# Patient Record
Sex: FEMALE | Race: BLACK OR AFRICAN AMERICAN | NOT HISPANIC OR LATINO | Employment: FULL TIME | URBAN - METROPOLITAN AREA
[De-identification: names, ages, dates, MRNs, and addresses within clinical notes are randomized per-mention and may not be internally consistent; named-entity substitution may affect disease eponyms.]

---

## 2021-01-12 ENCOUNTER — HISTORICAL (OUTPATIENT)
Dept: RADIOLOGY | Facility: HOSPITAL | Age: 46
End: 2021-01-12

## 2021-01-19 ENCOUNTER — HISTORICAL (OUTPATIENT)
Dept: RADIOLOGY | Facility: HOSPITAL | Age: 46
End: 2021-01-19

## 2021-06-01 LAB
ABS NEUT (OLG): 3.6 X10(3)/MCL (ref 1.5–6.9)
APPEARANCE, UA: ABNORMAL
B-HCG SERPL QL: NEGATIVE
BACTERIA SPEC CULT: ABNORMAL /HPF
BILIRUB UR QL STRIP: NEGATIVE
BUN SERPL-MCNC: 8 MG/DL (ref 7–18.7)
CALCIUM SERPL-MCNC: 9.3 MG/DL (ref 8.4–10.2)
CHLORIDE SERPL-SCNC: 105 MMOL/L (ref 98–107)
CO2 SERPL-SCNC: 28 MMOL/L (ref 22–29)
COLOR UR: YELLOW
CREAT SERPL-MCNC: 0.89 MG/DL (ref 0.55–1.02)
CREAT/UREA NIT SERPL: 9
ERYTHROCYTE [DISTWIDTH] IN BLOOD BY AUTOMATED COUNT: 14.7 % (ref 11.5–17)
EST CREAT CLEARANCE SER (OHS): 193 ML/MIN
GLUCOSE (UA): NEGATIVE MG/DL
GLUCOSE SERPL-MCNC: 117 MG/DL (ref 74–100)
HCT VFR BLD AUTO: 41.5 % (ref 36–48)
HGB BLD-MCNC: 12.9 GM/DL (ref 12–16)
HGB UR QL STRIP: ABNORMAL
KETONES UR QL STRIP: NEGATIVE MG/DL
LEUKOCYTE ESTERASE UR QL STRIP: ABNORMAL
MCH RBC QN AUTO: 27 PG (ref 27–34)
MCHC RBC AUTO-ENTMCNC: 31 GM/DL (ref 31–36)
MCV RBC AUTO: 86 FL (ref 80–99)
NITRITE UR QL STRIP: POSITIVE
PH UR STRIP: 5.5 [PH] (ref 4.6–8)
PLATELET # BLD AUTO: 321 X10(3)/MCL (ref 140–400)
PMV BLD AUTO: 11.3 FL (ref 6.8–10)
POTASSIUM SERPL-SCNC: 3.7 MMOL/L (ref 3.5–5.1)
PROT UR QL STRIP: ABNORMAL
RBC # BLD AUTO: 4.84 X10(6)/MCL (ref 4.2–5.4)
RBC #/AREA URNS HPF: ABNORMAL /HPF
SARS-COV-2 AG RESP QL IA.RAPID: NOT DETECTED
SODIUM SERPL-SCNC: 140 MMOL/L (ref 136–145)
SP GR UR STRIP: 1.01 (ref 1–1.03)
SQUAMOUS EPITHELIAL, UA: ABNORMAL /LPF
UROBILINOGEN UR STRIP-ACNC: 0.2 EU/DL
WBC # SPEC AUTO: 7 X10(3)/MCL (ref 4.5–11.5)
WBC #/AREA URNS HPF: ABNORMAL /HPF

## 2021-06-03 ENCOUNTER — HISTORICAL (OUTPATIENT)
Dept: ANESTHESIOLOGY | Facility: HOSPITAL | Age: 46
End: 2021-06-03

## 2021-09-20 ENCOUNTER — HISTORICAL (OUTPATIENT)
Dept: RADIOLOGY | Facility: HOSPITAL | Age: 46
End: 2021-09-20

## 2021-09-23 LAB — GLUCOSE SERPL-MCNC: 118 MG/DL (ref 70–110)

## 2022-04-30 NOTE — OP NOTE
PREOPERATIVE DIAGNOSIS:  Medial meniscal tear.    POSTOPERATIVE DIAGNOSIS:  Posterior horn medial meniscal tear and chondral lesion medial femoral condyle.    PROCEDURE:  Arthroscopy, partial medial meniscectomy, chondroplasty medially.    ANESTHESIA:  Spinal.    COMPLICATIONS:  None.    DESCRIPTION OF PROCEDURE:  The patient is 45 with pain and swelling.  MRI confirmed medial meniscal tear.  She was brought the OR, given IV antibiotics, general anesthesia.  Inflow was established.  The knee was examined arthroscopically.  She was morbidly obese which made arthroscopy a little bit challenging, but we were able to establish inflow.  In the notch of the knee, the anterior posterior cruciate ligaments were noted to be intact.  Lateral joint line was pristine.  Patellofemoral had some mild chondral scuffing.  Medially she had significant medial femoral condylar lesion with unstable grade 3 changes.  She had a posterior horn medial meniscal tear.  Using biters and a shaver, I did a partial medial meniscectomy to a stable rim.  I did a chondroplasty medially.  The scope was withdrawn.  Portals were closed with nylon.  The knee was injected.  Sterile dressing applied.  No complications.        MRH/MODL   DD: 06/03/2021 0854   DT: 06/03/2021 0908  Job # [id number]

## 2023-07-17 ENCOUNTER — HOSPITAL ENCOUNTER (EMERGENCY)
Facility: HOSPITAL | Age: 48
Discharge: HOME | End: 2023-07-17
Attending: INTERNAL MEDICINE
Payer: COMMERCIAL

## 2023-07-17 VITALS
TEMPERATURE: 98 F | RESPIRATION RATE: 18 BRPM | DIASTOLIC BLOOD PRESSURE: 99 MMHG | BODY MASS INDEX: 48.82 KG/M2 | HEIGHT: 65 IN | WEIGHT: 293 LBS | HEART RATE: 95 BPM | SYSTOLIC BLOOD PRESSURE: 175 MMHG | OXYGEN SATURATION: 98 %

## 2023-07-17 DIAGNOSIS — K04.7 DENTAL INFECTION: Primary | ICD-10-CM

## 2023-07-17 DIAGNOSIS — K02.9 DENTAL CARIES: ICD-10-CM

## 2023-07-17 DIAGNOSIS — K08.89 PAIN, DENTAL: ICD-10-CM

## 2023-07-17 PROCEDURE — 63600175 PHARM REV CODE 636 W HCPCS: Performed by: INTERNAL MEDICINE

## 2023-07-17 PROCEDURE — 25000003 PHARM REV CODE 250: Performed by: INTERNAL MEDICINE

## 2023-07-17 PROCEDURE — 96372 THER/PROPH/DIAG INJ SC/IM: CPT | Performed by: INTERNAL MEDICINE

## 2023-07-17 PROCEDURE — 99284 EMERGENCY DEPT VISIT MOD MDM: CPT

## 2023-07-17 RX ORDER — AMOXICILLIN 500 MG/1
500 TABLET, FILM COATED ORAL EVERY 12 HOURS
Qty: 20 TABLET | Refills: 0 | Status: SHIPPED | OUTPATIENT
Start: 2023-07-17 | End: 2023-07-27

## 2023-07-17 RX ORDER — KETOROLAC TROMETHAMINE 30 MG/ML
60 INJECTION, SOLUTION INTRAMUSCULAR; INTRAVENOUS
Status: COMPLETED | OUTPATIENT
Start: 2023-07-17 | End: 2023-07-17

## 2023-07-17 RX ORDER — AMOXICILLIN 250 MG/1
500 CAPSULE ORAL
Status: COMPLETED | OUTPATIENT
Start: 2023-07-17 | End: 2023-07-17

## 2023-07-17 RX ORDER — LIDOCAINE HYDROCHLORIDE 20 MG/ML
SOLUTION OROPHARYNGEAL
Qty: 100 ML | Refills: 0 | Status: SHIPPED | OUTPATIENT
Start: 2023-07-17 | End: 2023-07-22

## 2023-07-17 RX ADMIN — KETOROLAC TROMETHAMINE 60 MG: 30 INJECTION, SOLUTION INTRAMUSCULAR at 05:07

## 2023-07-17 RX ADMIN — AMOXICILLIN 500 MG: 250 CAPSULE ORAL at 05:07

## 2023-07-17 NOTE — ED PROVIDER NOTES
Encounter Date: 7/17/2023       History     Chief Complaint   Patient presents with    Dental Problem     Pt states that she has a dental abscess that she has been having for a while and has not gotten it pulled yet because she has been trying to get her BP under control. States this morning the pain is much worse     47-year-old black female presents emergency department complaining of pain in the right lower jaw in an area where she is supposed to have a tooth pulled.  She reports she went to the dentist in her pressure was too high so she would to go back to her primary care and get a blood pressure under control before she can get a tooth pulled    Review of patient's allergies indicates:  No Known Allergies  Past Medical History:   Diagnosis Date    Hypertension      History reviewed. No pertinent surgical history.  History reviewed. No pertinent family history.     Review of Systems   Constitutional: Negative.  Negative for activity change, appetite change, chills, diaphoresis, fatigue, fever and unexpected weight change.   HENT:  Positive for dental problem. Negative for congestion, drooling, ear discharge, ear pain, facial swelling, hearing loss, mouth sores, nosebleeds, postnasal drip, rhinorrhea, sinus pressure, sinus pain, sneezing, sore throat, tinnitus, trouble swallowing and voice change.    Eyes: Negative.  Negative for photophobia, pain, discharge, redness, itching and visual disturbance.   Respiratory: Negative.  Negative for apnea, cough, choking, chest tightness, shortness of breath, wheezing and stridor.    Cardiovascular: Negative.  Negative for chest pain, palpitations and leg swelling.   Gastrointestinal: Negative.  Negative for abdominal distention, abdominal pain, anal bleeding, blood in stool, constipation, diarrhea, nausea, rectal pain and vomiting.   Endocrine: Negative.  Negative for cold intolerance, heat intolerance, polydipsia, polyphagia and polyuria.   Genitourinary: Negative.   Negative for decreased urine volume, difficulty urinating, dyspareunia, dysuria, enuresis, flank pain, frequency, genital sores, hematuria, menstrual problem, pelvic pain, urgency, vaginal bleeding, vaginal discharge and vaginal pain.   Musculoskeletal: Negative.  Negative for arthralgias, back pain, gait problem, joint swelling, myalgias, neck pain and neck stiffness.   Skin: Negative.  Negative for color change, pallor, rash and wound.   Allergic/Immunologic: Negative.  Negative for environmental allergies, food allergies and immunocompromised state.   Neurological: Negative.  Negative for dizziness, tremors, seizures, syncope, facial asymmetry, speech difficulty, weakness, light-headedness, numbness and headaches.   Hematological: Negative.  Negative for adenopathy. Does not bruise/bleed easily.   Psychiatric/Behavioral: Negative.  Negative for agitation, behavioral problems, confusion, decreased concentration, dysphoric mood, hallucinations, self-injury, sleep disturbance and suicidal ideas. The patient is not nervous/anxious and is not hyperactive.    All other systems reviewed and are negative.    Physical Exam     Initial Vitals [07/17/23 0401]   BP Pulse Resp Temp SpO2   (!) 175/99 95 18 97.6 °F (36.4 °C) 98 %      MAP       --         Physical Exam    Nursing note and vitals reviewed.  Constitutional: She appears well-developed and well-nourished.   HENT:   Head: Normocephalic and atraumatic.   Nose: Nose normal.   Mouth/Throat: Oropharynx is clear and moist.       Eyes: EOM are normal.   Neck: Neck supple.   Normal range of motion.  Musculoskeletal:         General: Normal range of motion.      Cervical back: Normal range of motion and neck supple.     Neurological: She is alert.   Skin: Skin is warm.   Psychiatric: She has a normal mood and affect.       ED Course   Procedures  Labs Reviewed - No data to display       Imaging Results    None          Medications   ketorolac injection 60 mg (has no  administration in time range)   amoxicillin capsule 500 mg (has no administration in time range)     Medical Decision Making:   Differential Diagnosis:   Dental caries, dental abscess, gingivitis,                        Clinical Impression:   Final diagnoses:  [K04.7] Dental infection (Primary)  [K02.9] Dental caries  [K08.89] Pain, dental        ED Disposition Condition    Discharge Stable          ED Prescriptions       Medication Sig Dispense Start Date End Date Auth. Provider    amoxicillin (AMOXIL) 500 MG Tab Take 1 tablet (500 mg total) by mouth every 12 (twelve) hours. for 10 days 20 tablet 7/17/2023 7/27/2023 Kiko Cannon MD    LIDOcaine HCl 2% (LIDOCAINE VISCOUS) 2 % Soln by Mucous Membrane route every 3 (three) hours. for 5 days 100 mL 7/17/2023 7/22/2023 Kiko Cannon MD          Follow-up Information       Follow up With Specialties Details Why Contact Info    Maik Glil MD Family Medicine In 2 days  [address]  [address]  [place] [address] [phone]            Amee Augustin is a certified MA and was present during the entire interaction with this patient      Kiko Cannon MD  07/17/23 8974

## 2023-07-17 NOTE — Clinical Note
"Aide "Milton Arreola was seen and treated in our emergency department on 7/17/2023.  She may return to work on 07/18/2023.       If you have any questions or concerns, please don't hesitate to call.      Liz Cabrera LPN    "

## 2023-10-19 ENCOUNTER — HOSPITAL ENCOUNTER (INPATIENT)
Facility: HOSPITAL | Age: 48
LOS: 4 days | Discharge: HOME | DRG: 690 | End: 2023-10-23
Attending: FAMILY MEDICINE | Admitting: FAMILY MEDICINE
Payer: COMMERCIAL

## 2023-10-19 DIAGNOSIS — E87.6 HYPOKALEMIA: Primary | ICD-10-CM

## 2023-10-19 DIAGNOSIS — R07.9 CHEST PAIN: ICD-10-CM

## 2023-10-19 DIAGNOSIS — N12 PYELONEPHRITIS: ICD-10-CM

## 2023-10-19 PROBLEM — R53.1 WEAKNESS: Status: ACTIVE | Noted: 2023-10-19

## 2023-10-19 PROBLEM — F32.A DEPRESSIVE DISORDER: Status: ACTIVE | Noted: 2023-10-19

## 2023-10-19 PROBLEM — R06.02 SHORTNESS OF BREATH: Status: ACTIVE | Noted: 2023-10-19

## 2023-10-19 PROBLEM — E66.01 MORBID OBESITY: Status: ACTIVE | Noted: 2023-10-19

## 2023-10-19 PROBLEM — I10 HYPERTENSION: Status: ACTIVE | Noted: 2023-10-19

## 2023-10-19 PROBLEM — R11.0 NAUSEA: Status: ACTIVE | Noted: 2023-10-19

## 2023-10-19 LAB
ALBUMIN SERPL-MCNC: 2.9 G/DL (ref 3.5–5)
ALBUMIN/GLOB SERPL: 0.6 RATIO (ref 1.1–2)
ALP SERPL-CCNC: 87 UNIT/L (ref 40–150)
ALT SERPL-CCNC: 14 UNIT/L (ref 0–55)
AST SERPL-CCNC: 26 UNIT/L (ref 5–34)
BASOPHILS # BLD AUTO: 0.01 X10(3)/MCL
BASOPHILS NFR BLD AUTO: 0.1 %
BILIRUB SERPL-MCNC: 0.9 MG/DL
BNP BLD-MCNC: 310.4 PG/ML
BUN SERPL-MCNC: 5 MG/DL (ref 7–18.7)
CALCIUM SERPL-MCNC: 8.8 MG/DL (ref 8.4–10.2)
CHLORIDE SERPL-SCNC: 101 MMOL/L (ref 98–107)
CK MB SERPL-MCNC: <1 NG/ML
CO2 SERPL-SCNC: 23 MMOL/L (ref 22–29)
CREAT SERPL-MCNC: 0.83 MG/DL (ref 0.55–1.02)
EOSINOPHIL # BLD AUTO: 0 X10(3)/MCL (ref 0–0.9)
EOSINOPHIL NFR BLD AUTO: 0 %
ERYTHROCYTE [DISTWIDTH] IN BLOOD BY AUTOMATED COUNT: 14.2 % (ref 11.5–17)
GFR SERPLBLD CREATININE-BSD FMLA CKD-EPI: >60 MLS/MIN/1.73/M2
GLOBULIN SER-MCNC: 4.5 GM/DL (ref 2.4–3.5)
GLUCOSE SERPL-MCNC: 140 MG/DL (ref 74–100)
HCT VFR BLD AUTO: 36.5 % (ref 37–47)
HGB BLD-MCNC: 12.1 G/DL (ref 12–16)
IMM GRANULOCYTES # BLD AUTO: 0.05 X10(3)/MCL (ref 0–0.04)
IMM GRANULOCYTES NFR BLD AUTO: 0.6 %
INR PPP: 1.2
LYMPHOCYTES # BLD AUTO: 0.98 X10(3)/MCL (ref 0.6–4.6)
LYMPHOCYTES NFR BLD AUTO: 12.4 %
MCH RBC QN AUTO: 28.1 PG (ref 27–31)
MCHC RBC AUTO-ENTMCNC: 33.2 G/DL (ref 33–36)
MCV RBC AUTO: 84.7 FL (ref 80–94)
MONOCYTES # BLD AUTO: 0.57 X10(3)/MCL (ref 0.1–1.3)
MONOCYTES NFR BLD AUTO: 7.2 %
NEUTROPHILS # BLD AUTO: 6.28 X10(3)/MCL (ref 2.1–9.2)
NEUTROPHILS NFR BLD AUTO: 79.7 %
PLATELET # BLD AUTO: 195 X10(3)/MCL (ref 130–400)
PMV BLD AUTO: 11.2 FL (ref 7.4–10.4)
POTASSIUM SERPL-SCNC: 2.6 MMOL/L (ref 3.5–5.1)
PROT SERPL-MCNC: 7.4 GM/DL (ref 6.4–8.3)
PROTHROMBIN TIME: 15.3 SECONDS (ref 12.5–14.5)
RBC # BLD AUTO: 4.31 X10(6)/MCL (ref 4.2–5.4)
SODIUM SERPL-SCNC: 136 MMOL/L (ref 136–145)
TROPONIN I SERPL-MCNC: 0.05 NG/ML (ref 0–0.04)
TSH SERPL-ACNC: 1.06 UIU/ML (ref 0.35–4.94)
WBC # SPEC AUTO: 7.89 X10(3)/MCL (ref 4.5–11.5)

## 2023-10-19 PROCEDURE — 63600175 PHARM REV CODE 636 W HCPCS: Performed by: FAMILY MEDICINE

## 2023-10-19 PROCEDURE — 87040 BLOOD CULTURE FOR BACTERIA: CPT | Performed by: FAMILY MEDICINE

## 2023-10-19 PROCEDURE — C9113 INJ PANTOPRAZOLE SODIUM, VIA: HCPCS | Performed by: FAMILY MEDICINE

## 2023-10-19 PROCEDURE — 85025 COMPLETE CBC W/AUTO DIFF WBC: CPT | Performed by: FAMILY MEDICINE

## 2023-10-19 PROCEDURE — C1751 CATH, INF, PER/CENT/MIDLINE: HCPCS

## 2023-10-19 PROCEDURE — 25000003 PHARM REV CODE 250: Performed by: FAMILY MEDICINE

## 2023-10-19 PROCEDURE — 83880 ASSAY OF NATRIURETIC PEPTIDE: CPT | Performed by: FAMILY MEDICINE

## 2023-10-19 PROCEDURE — 36569 INSJ PICC 5 YR+ W/O IMAGING: CPT

## 2023-10-19 PROCEDURE — 82553 CREATINE MB FRACTION: CPT | Performed by: FAMILY MEDICINE

## 2023-10-19 PROCEDURE — 81025 URINE PREGNANCY TEST: CPT | Performed by: FAMILY MEDICINE

## 2023-10-19 PROCEDURE — 85610 PROTHROMBIN TIME: CPT | Performed by: FAMILY MEDICINE

## 2023-10-19 PROCEDURE — 87088 URINE BACTERIA CULTURE: CPT | Performed by: FAMILY MEDICINE

## 2023-10-19 PROCEDURE — 80053 COMPREHEN METABOLIC PANEL: CPT | Performed by: FAMILY MEDICINE

## 2023-10-19 PROCEDURE — 11000001 HC ACUTE MED/SURG PRIVATE ROOM

## 2023-10-19 PROCEDURE — A4216 STERILE WATER/SALINE, 10 ML: HCPCS | Performed by: FAMILY MEDICINE

## 2023-10-19 PROCEDURE — 84443 ASSAY THYROID STIM HORMONE: CPT | Performed by: FAMILY MEDICINE

## 2023-10-19 PROCEDURE — 84484 ASSAY OF TROPONIN QUANT: CPT | Performed by: FAMILY MEDICINE

## 2023-10-19 PROCEDURE — 94761 N-INVAS EAR/PLS OXIMETRY MLT: CPT

## 2023-10-19 RX ORDER — VALSARTAN 80 MG/1
160 TABLET ORAL DAILY
Status: DISCONTINUED | OUTPATIENT
Start: 2023-10-20 | End: 2023-10-23 | Stop reason: HOSPADM

## 2023-10-19 RX ORDER — ZOLPIDEM TARTRATE 10 MG/1
10 TABLET ORAL
COMMUNITY
Start: 2021-06-01

## 2023-10-19 RX ORDER — QUETIAPINE FUMARATE 25 MG/1
25 TABLET, FILM COATED ORAL NIGHTLY
COMMUNITY
Start: 2023-10-13

## 2023-10-19 RX ORDER — DULOXETIN HYDROCHLORIDE 60 MG/1
60 CAPSULE, DELAYED RELEASE ORAL
COMMUNITY
Start: 2023-09-22

## 2023-10-19 RX ORDER — SODIUM CHLORIDE 0.9 % (FLUSH) 0.9 %
3 SYRINGE (ML) INJECTION EVERY 12 HOURS PRN
Status: DISCONTINUED | OUTPATIENT
Start: 2023-10-19 | End: 2023-10-23 | Stop reason: HOSPADM

## 2023-10-19 RX ORDER — NALOXONE HCL 0.4 MG/ML
0.02 VIAL (ML) INJECTION
Status: DISCONTINUED | OUTPATIENT
Start: 2023-10-19 | End: 2023-10-23 | Stop reason: HOSPADM

## 2023-10-19 RX ORDER — SODIUM CHLORIDE 0.9 % (FLUSH) 0.9 %
10 SYRINGE (ML) INJECTION
Status: DISCONTINUED | OUTPATIENT
Start: 2023-10-19 | End: 2023-10-23 | Stop reason: HOSPADM

## 2023-10-19 RX ORDER — ACETAMINOPHEN 325 MG/1
650 TABLET ORAL EVERY 4 HOURS PRN
Status: DISCONTINUED | OUTPATIENT
Start: 2023-10-19 | End: 2023-10-23 | Stop reason: HOSPADM

## 2023-10-19 RX ORDER — DULOXETIN HYDROCHLORIDE 60 MG/1
60 CAPSULE, DELAYED RELEASE ORAL DAILY
Status: DISCONTINUED | OUTPATIENT
Start: 2023-10-20 | End: 2023-10-23 | Stop reason: HOSPADM

## 2023-10-19 RX ORDER — HYDROCHLOROTHIAZIDE 25 MG/1
25 TABLET ORAL DAILY
Status: DISCONTINUED | OUTPATIENT
Start: 2023-10-20 | End: 2023-10-23 | Stop reason: HOSPADM

## 2023-10-19 RX ORDER — SUMATRIPTAN SUCCINATE 100 MG/1
100 TABLET ORAL
COMMUNITY
Start: 2021-06-01

## 2023-10-19 RX ORDER — ZOLPIDEM TARTRATE 5 MG/1
5 TABLET ORAL NIGHTLY PRN
Status: DISCONTINUED | OUTPATIENT
Start: 2023-10-19 | End: 2023-10-23 | Stop reason: HOSPADM

## 2023-10-19 RX ORDER — VALSARTAN AND HYDROCHLOROTHIAZIDE 160; 25 MG/1; MG/1
1 TABLET ORAL DAILY
Status: DISCONTINUED | OUTPATIENT
Start: 2023-10-20 | End: 2023-10-19

## 2023-10-19 RX ORDER — VALSARTAN AND HYDROCHLOROTHIAZIDE 160; 25 MG/1; MG/1
1 TABLET ORAL
COMMUNITY
Start: 2023-08-19

## 2023-10-19 RX ORDER — ONDANSETRON 2 MG/ML
4 INJECTION INTRAMUSCULAR; INTRAVENOUS EVERY 8 HOURS PRN
Status: DISCONTINUED | OUTPATIENT
Start: 2023-10-19 | End: 2023-10-23 | Stop reason: HOSPADM

## 2023-10-19 RX ORDER — SODIUM CHLORIDE 0.9 % (FLUSH) 0.9 %
10 SYRINGE (ML) INJECTION EVERY 6 HOURS
Status: DISCONTINUED | OUTPATIENT
Start: 2023-10-19 | End: 2023-10-23 | Stop reason: HOSPADM

## 2023-10-19 RX ORDER — ZOLPIDEM TARTRATE 5 MG/1
10 TABLET ORAL NIGHTLY
Status: DISCONTINUED | OUTPATIENT
Start: 2023-10-19 | End: 2023-10-19

## 2023-10-19 RX ORDER — ENOXAPARIN SODIUM 100 MG/ML
40 INJECTION SUBCUTANEOUS EVERY 24 HOURS
Status: DISCONTINUED | OUTPATIENT
Start: 2023-10-19 | End: 2023-10-23 | Stop reason: HOSPADM

## 2023-10-19 RX ORDER — SIMETHICONE 80 MG
1 TABLET,CHEWABLE ORAL 4 TIMES DAILY PRN
Status: DISCONTINUED | OUTPATIENT
Start: 2023-10-19 | End: 2023-10-23 | Stop reason: HOSPADM

## 2023-10-19 RX ORDER — QUETIAPINE FUMARATE 25 MG/1
25 TABLET, FILM COATED ORAL NIGHTLY
Status: DISCONTINUED | OUTPATIENT
Start: 2023-10-19 | End: 2023-10-23 | Stop reason: HOSPADM

## 2023-10-19 RX ORDER — MUPIROCIN 20 MG/G
OINTMENT TOPICAL 2 TIMES DAILY
Status: DISCONTINUED | OUTPATIENT
Start: 2023-10-19 | End: 2023-10-23 | Stop reason: HOSPADM

## 2023-10-19 RX ORDER — HYDRALAZINE HYDROCHLORIDE 20 MG/ML
10 INJECTION INTRAMUSCULAR; INTRAVENOUS EVERY 6 HOURS PRN
Status: DISCONTINUED | OUTPATIENT
Start: 2023-10-19 | End: 2023-10-23 | Stop reason: HOSPADM

## 2023-10-19 RX ORDER — POTASSIUM CHLORIDE 20 MEQ/1
20 TABLET, EXTENDED RELEASE ORAL DAILY
Status: DISCONTINUED | OUTPATIENT
Start: 2023-10-20 | End: 2023-10-23 | Stop reason: HOSPADM

## 2023-10-19 RX ORDER — ACETAMINOPHEN 325 MG/1
650 TABLET ORAL EVERY 8 HOURS PRN
Status: DISCONTINUED | OUTPATIENT
Start: 2023-10-19 | End: 2023-10-23 | Stop reason: HOSPADM

## 2023-10-19 RX ORDER — DEXTROSE MONOHYDRATE, SODIUM CHLORIDE, AND POTASSIUM CHLORIDE 50; 1.49; 9 G/1000ML; G/1000ML; G/1000ML
INJECTION, SOLUTION INTRAVENOUS CONTINUOUS
Status: DISCONTINUED | OUTPATIENT
Start: 2023-10-19 | End: 2023-10-19

## 2023-10-19 RX ADMIN — ZOLPIDEM TARTRATE 5 MG: 5 TABLET ORAL at 09:10

## 2023-10-19 RX ADMIN — SODIUM CHLORIDE, PRESERVATIVE FREE 10 ML: 5 INJECTION INTRAVENOUS at 06:10

## 2023-10-19 RX ADMIN — POTASSIUM CHLORIDE: 149 INJECTION, SOLUTION, CONCENTRATE INTRAVENOUS at 05:10

## 2023-10-19 RX ADMIN — QUETIAPINE FUMARATE 25 MG: 25 TABLET ORAL at 09:10

## 2023-10-19 RX ADMIN — PANTOPRAZOLE SODIUM 8 MG/HR: 40 INJECTION, POWDER, FOR SOLUTION INTRAVENOUS at 10:10

## 2023-10-19 RX ADMIN — PIPERACILLIN AND TAZOBACTAM 4.5 G: 4; .5 INJECTION, POWDER, LYOPHILIZED, FOR SOLUTION INTRAVENOUS; PARENTERAL at 05:10

## 2023-10-19 RX ADMIN — PANTOPRAZOLE SODIUM 8 MG/HR: 40 INJECTION, POWDER, FOR SOLUTION INTRAVENOUS at 05:10

## 2023-10-19 RX ADMIN — ENOXAPARIN SODIUM 40 MG: 40 INJECTION SUBCUTANEOUS at 06:10

## 2023-10-19 RX ADMIN — HYDRALAZINE HYDROCHLORIDE 10 MG: 20 INJECTION INTRAMUSCULAR; INTRAVENOUS at 09:10

## 2023-10-19 NOTE — H&P
[facility] Medicine  History & Physical    Patient Name: Aide Arreola  MRN: [id number]  Patient Class: IP- Inpatient  Admission Date: 10/19/2023  Attending Physician: Maik Gill MD   Primary Care Provider: Maik Gill MD         Patient information was obtained from patient and ER records.     Subjective:     Principal Problem:Pyelonephritis    Chief Complaint:   Chief Complaint   Patient presents with    Fever        HPI: Bed to my office with fever and weakness the patient is a 48-year-old  female known to me from clinic.  She was seen in the ER last night diagnosed with urinary tract infection she has since developed a fever of 101.5 chills rigors.  She had full workup for flu COVID last night which was all within normal limits states today she is having dysuria hematuria she is had some right flank pain and pain to her right inguinal area.  We are admitting the hospital for probable pyelonephritis.  She currently has nausea and has thrown up at least twice.  No other abdominal pain outside of the flank pain mentioned previously.  No coughing no chest pain that she says she is a little short of breath at this time currently oxygen sats are within normal limits.  She is had this has progressed over 3-4 days and culminated today.  Denies any sick contacts any recent travel or anything else out of the ordinary      Past Medical History:   Diagnosis Date    Anxiety disorder, unspecified     Hypertension        Past Surgical History:   Procedure Laterality Date     SECTION         Review of patient's allergies indicates:  No Known Allergies    Current Facility-Administered Medications on File Prior to Encounter   Medication    [COMPLETED] cefTRIAXone injection 1 g    [COMPLETED] LIDOcaine (PF) 10 mg/ml (1%) injection 20 mg     Current Outpatient Medications on File Prior to Encounter   Medication Sig    cefdinir (OMNICEF) 300  MG capsule Take 1 capsule (300 mg total) by mouth 2 (two) times daily. for 10 days    QUEtiapine (SEROQUEL) 25 MG Tab Take 25 mg by mouth every evening.    zolpidem (AMBIEN) 10 mg Tab Take 10 mg by mouth.    DULoxetine (CYMBALTA) 60 MG capsule Take 60 mg by mouth.    sumatriptan (IMITREX) 100 MG tablet Take 100 mg by mouth.    valsartan-hydrochlorothiazide (DIOVAN-HCT) 160-25 mg per tablet Take 1 tablet by mouth.     Family History       Problem Relation (Age of Onset)    Cancer Father          Tobacco Use    Smoking status: Never    Smokeless tobacco: Never   Substance and Sexual Activity    Alcohol use: Not Currently    Drug use: Not Currently    Sexual activity: Not Currently     Review of Systems   Constitutional:  Positive for chills, fatigue and fever.   HENT: Negative.     Eyes: Negative.    Respiratory:  Positive for shortness of breath. Negative for wheezing.    Cardiovascular: Negative.    Gastrointestinal:  Positive for nausea. Negative for abdominal pain.   Endocrine: Negative.    Genitourinary:  Positive for dysuria, flank pain, frequency and hematuria.   Musculoskeletal:  Positive for arthralgias.   Skin: Negative.    Allergic/Immunologic: Negative.    Neurological: Negative.    Hematological: Negative.    Psychiatric/Behavioral: Negative.       Objective:     Vital Signs (Most Recent):  Temp: 100.3 °F (37.9 °C) (10/19/23 1605)  Pulse: 86 (10/19/23 1605)  Resp: 20 (10/19/23 1605)  BP: (!) 150/81 (10/19/23 1605)  SpO2: 100 % (10/19/23 1605) Vital Signs (24h Range):  Temp:  [100.3 °F (37.9 °C)] 100.3 °F (37.9 °C)  Pulse:  [86] 86  Resp:  [20] 20  SpO2:  [100 %] 100 %  BP: (150-169)/(81-84) 150/81     Weight: (!) 145.2 kg (320 lb)  Body mass index is 54.93 kg/m².     Physical Exam  Constitutional:       General: She is not in acute distress.     Appearance: She is obese.   HENT:      Head: Normocephalic and atraumatic.      Right Ear: Tympanic membrane normal.      Left Ear: Tympanic membrane  normal.      Nose: Nose normal.      Mouth/Throat:      Mouth: Mucous membranes are moist.      Pharynx: Oropharynx is clear.   Eyes:      Extraocular Movements: Extraocular movements intact.      Pupils: Pupils are equal, round, and reactive to light.   Cardiovascular:      Rate and Rhythm: Normal rate and regular rhythm.      Pulses: Normal pulses.      Heart sounds: Normal heart sounds. No murmur heard.     No friction rub.   Pulmonary:      Effort: Pulmonary effort is normal. No respiratory distress.      Breath sounds: Normal breath sounds. No stridor.   Abdominal:      General: Abdomen is flat. Bowel sounds are normal.      Palpations: Abdomen is soft.      Comments: Pain to the right flank and right inguinal area to palpation   Genitourinary:     General: Normal vulva.   Musculoskeletal:         General: No swelling or tenderness. Normal range of motion.      Cervical back: Normal range of motion and neck supple.   Skin:     General: Skin is warm and dry.   Neurological:      General: No focal deficit present.      Mental Status: She is alert and oriented to person, place, and time. Mental status is at baseline.   Psychiatric:         Mood and Affect: Mood normal.         Behavior: Behavior normal.         Thought Content: Thought content normal.         Judgment: Judgment normal.              CRANIAL NERVES     CN III, IV, VI   Pupils are equal, round, and reactive to light.       Significant Labs: All pertinent labs within the past 24 hours have been reviewed.    Significant Imaging: I have reviewed all pertinent imaging results/findings within the past 24 hours.    Assessment/Plan:     * Pyelonephritis  All place the patient on IV Zosyn at this time 3.375 q.6 hours.  We will follow her culture results and adjust as necessary also place her on D5 half-normal saline with 20 mEq of KCl.  I performed a full septic workup thus far chest x-ray within normal limits her white blood cell count is within normal  limits chemistries and other studies are pending at this time.      Shortness of breath  Chest x-ray is within normal limits at this time we will continue to monitor closely      Morbid obesity  Body mass index is 54.93 kg/m². Morbid obesity complicates all aspects of disease management from diagnostic modalities to treatment. Weight loss encouraged and health benefits explained to patient.         Hypertension  Continue home meds as written stable at this time      Depressive disorder  Continue home meds as written    Nausea  Continue Zofran as necessary      Weakness  Likely due to her pyelonephritis.  We will get CT scan to be sure that there is not a stone present or any other further abdominal pathology.      We will follow chemistries and adjust as necessary.        VTE Risk Mitigation (From admission, onward)         Ordered     enoxaparin injection 40 mg  Every 24 hours         10/19/23 1508     IP VTE LOW RISK PATIENT  Once         10/19/23 1508     Place sequential compression device  Until discontinued         10/19/23 1508                               Maik Gill MD  Department of Hospital Medicine  [facility]    COMPLETED  Family history is reviewed and has not changed   Pertinent information:

## 2023-10-19 NOTE — ASSESSMENT & PLAN NOTE
Body mass index is 54.93 kg/m². Morbid obesity complicates all aspects of disease management from diagnostic modalities to treatment. Weight loss encouraged and health benefits explained to patient.

## 2023-10-19 NOTE — PLAN OF CARE
Problem: Adult Inpatient Plan of Care  Goal: Plan of Care Review  Outcome: Ongoing, Progressing  Goal: Patient-Specific Goal (Individualized)  Outcome: Ongoing, Progressing  Goal: Absence of Hospital-Acquired Illness or Injury  Outcome: Ongoing, Progressing  Goal: Optimal Comfort and Wellbeing  Outcome: Ongoing, Progressing  Goal: Readiness for Transition of Care  Outcome: Ongoing, Progressing     Problem: Bariatric Environmental Safety  Goal: Safety Maintained with Care  Outcome: Ongoing, Progressing     Problem: Infection  Goal: Absence of Infection Signs and Symptoms  Outcome: Ongoing, Progressing     Problem: UTI (Urinary Tract Infection)  Goal: Improved Infection Symptoms  Outcome: Ongoing, Progressing     Problem: Fever  Goal: Body Temperature in Desired Range  Outcome: Ongoing, Progressing

## 2023-10-19 NOTE — HPI
Bed to my office with fever and weakness the patient is a 48-year-old  female known to me from clinic.  She was seen in the ER last night diagnosed with urinary tract infection she has since developed a fever of 101.5 chills rigors.  She had full workup for flu COVID last night which was all within normal limits states today she is having dysuria hematuria she is had some right flank pain and pain to her right inguinal area.  We are admitting the hospital for probable pyelonephritis.  She currently has nausea and has thrown up at least twice.  No other abdominal pain outside of the flank pain mentioned previously.  No coughing no chest pain that she says she is a little short of breath at this time currently oxygen sats are within normal limits.  She is had this has progressed over 3-4 days and culminated today.  Denies any sick contacts any recent travel or anything else out of the ordinary

## 2023-10-19 NOTE — ASSESSMENT & PLAN NOTE
Likely due to her pyelonephritis.  We will get CT scan to be sure that there is not a stone present or any other further abdominal pathology.      We will follow chemistries and adjust as necessary.

## 2023-10-19 NOTE — PROCEDURES
"Aide Arreola is a 48 y.o. female patient.    Temp: 100.3 °F (37.9 °C) (10/19/23 1605)  Pulse: 86 (10/19/23 1605)  Resp: 20 (10/19/23 1605)  BP: (!) 150/81 (10/19/23 1605)  SpO2: 100 % (10/19/23 1605)  Weight: (!) 145.2 kg (320 lb) (10/19/23 1605)  Height: 5' 4" (162.6 cm) (10/19/23 1605)    PICC  Time out: Immediately prior to procedure a time out was called to verify the correct patient, procedure, equipment, support staff and site/side marked as required  Indications: med administration  Preparation: skin prepped with ChloraPrep  Skin prep agent dried: skin prep agent completely dried prior to procedure  Sterile barriers: all five maximum sterile barriers used - cap, mask, sterile gown, sterile gloves, and large sterile sheet  Hand hygiene: hand hygiene performed prior to central venous catheter insertion  Location details: right brachial  Catheter type: double lumen  Catheter size: 5 Fr  Catheter Length: 39cm    Ultrasound guidance: yes  Needle advanced into vessel with real time Ultrasound guidance.  Guidewire confirmed in vessel.  Sterile sheath used.            Name waqas  10/19/2023    "

## 2023-10-19 NOTE — SUBJECTIVE & OBJECTIVE
Past Medical History:   Diagnosis Date    Anxiety disorder, unspecified     Hypertension        Past Surgical History:   Procedure Laterality Date     SECTION         Review of patient's allergies indicates:  No Known Allergies    Current Facility-Administered Medications on File Prior to Encounter   Medication    [COMPLETED] cefTRIAXone injection 1 g    [COMPLETED] LIDOcaine (PF) 10 mg/ml (1%) injection 20 mg     Current Outpatient Medications on File Prior to Encounter   Medication Sig    cefdinir (OMNICEF) 300 MG capsule Take 1 capsule (300 mg total) by mouth 2 (two) times daily. for 10 days    QUEtiapine (SEROQUEL) 25 MG Tab Take 25 mg by mouth every evening.    zolpidem (AMBIEN) 10 mg Tab Take 10 mg by mouth.    DULoxetine (CYMBALTA) 60 MG capsule Take 60 mg by mouth.    sumatriptan (IMITREX) 100 MG tablet Take 100 mg by mouth.    valsartan-hydrochlorothiazide (DIOVAN-HCT) 160-25 mg per tablet Take 1 tablet by mouth.     Family History       Problem Relation (Age of Onset)    Cancer Father          Tobacco Use    Smoking status: Never    Smokeless tobacco: Never   Substance and Sexual Activity    Alcohol use: Not Currently    Drug use: Not Currently    Sexual activity: Not Currently     Review of Systems   Constitutional:  Positive for chills, fatigue and fever.   HENT: Negative.     Eyes: Negative.    Respiratory:  Positive for shortness of breath. Negative for wheezing.    Cardiovascular: Negative.    Gastrointestinal:  Positive for nausea. Negative for abdominal pain.   Endocrine: Negative.    Genitourinary:  Positive for dysuria, flank pain, frequency and hematuria.   Musculoskeletal:  Positive for arthralgias.   Skin: Negative.    Allergic/Immunologic: Negative.    Neurological: Negative.    Hematological: Negative.    Psychiatric/Behavioral: Negative.       Objective:     Vital Signs (Most Recent):  Temp: 100.3 °F (37.9 °C) (10/19/23 1605)  Pulse: 86 (10/19/23 1605)  Resp: 20 (10/19/23 1605)  BP:  (!) 150/81 (10/19/23 1605)  SpO2: 100 % (10/19/23 1605) Vital Signs (24h Range):  Temp:  [100.3 °F (37.9 °C)] 100.3 °F (37.9 °C)  Pulse:  [86] 86  Resp:  [20] 20  SpO2:  [100 %] 100 %  BP: (150-169)/(81-84) 150/81     Weight: (!) 145.2 kg (320 lb)  Body mass index is 54.93 kg/m².     Physical Exam  Constitutional:       General: She is not in acute distress.     Appearance: She is obese.   HENT:      Head: Normocephalic and atraumatic.      Right Ear: Tympanic membrane normal.      Left Ear: Tympanic membrane normal.      Nose: Nose normal.      Mouth/Throat:      Mouth: Mucous membranes are moist.      Pharynx: Oropharynx is clear.   Eyes:      Extraocular Movements: Extraocular movements intact.      Pupils: Pupils are equal, round, and reactive to light.   Cardiovascular:      Rate and Rhythm: Normal rate and regular rhythm.      Pulses: Normal pulses.      Heart sounds: Normal heart sounds. No murmur heard.     No friction rub.   Pulmonary:      Effort: Pulmonary effort is normal. No respiratory distress.      Breath sounds: Normal breath sounds. No stridor.   Abdominal:      General: Abdomen is flat. Bowel sounds are normal.      Palpations: Abdomen is soft.      Comments: Pain to the right flank and right inguinal area to palpation   Genitourinary:     General: Normal vulva.   Musculoskeletal:         General: No swelling or tenderness. Normal range of motion.      Cervical back: Normal range of motion and neck supple.   Skin:     General: Skin is warm and dry.   Neurological:      General: No focal deficit present.      Mental Status: She is alert and oriented to person, place, and time. Mental status is at baseline.   Psychiatric:         Mood and Affect: Mood normal.         Behavior: Behavior normal.         Thought Content: Thought content normal.         Judgment: Judgment normal.              CRANIAL NERVES     CN III, IV, VI   Pupils are equal, round, and reactive to light.       Significant Labs: All  pertinent labs within the past 24 hours have been reviewed.    Significant Imaging: I have reviewed all pertinent imaging results/findings within the past 24 hours.

## 2023-10-19 NOTE — ASSESSMENT & PLAN NOTE
All place the patient on IV Zosyn at this time 3.375 q.6 hours.  We will follow her culture results and adjust as necessary also place her on D5 half-normal saline with 20 mEq of KCl.  I performed a full septic workup thus far chest x-ray within normal limits her white blood cell count is within normal limits chemistries and other studies are pending at this time.

## 2023-10-20 PROBLEM — E87.6 HYPOKALEMIA: Status: ACTIVE | Noted: 2023-10-20

## 2023-10-20 LAB
ALBUMIN SERPL-MCNC: 2.6 G/DL (ref 3.5–5)
ALBUMIN/GLOB SERPL: 0.6 RATIO (ref 1.1–2)
ALP SERPL-CCNC: 79 UNIT/L (ref 40–150)
ALT SERPL-CCNC: 15 UNIT/L (ref 0–55)
AST SERPL-CCNC: 31 UNIT/L (ref 5–34)
B-HCG SERPL QL: NEGATIVE
BASOPHILS # BLD AUTO: 0.03 X10(3)/MCL
BASOPHILS NFR BLD AUTO: 0.5 %
BILIRUB SERPL-MCNC: 0.7 MG/DL
BUN SERPL-MCNC: 5 MG/DL (ref 7–18.7)
CALCIUM SERPL-MCNC: 8.5 MG/DL (ref 8.4–10.2)
CHLORIDE SERPL-SCNC: 103 MMOL/L (ref 98–107)
CK MB SERPL-MCNC: <1 NG/ML
CK MB SERPL-MCNC: <1 NG/ML
CO2 SERPL-SCNC: 23 MMOL/L (ref 22–29)
CREAT SERPL-MCNC: 0.87 MG/DL (ref 0.55–1.02)
EOSINOPHIL # BLD AUTO: 0 X10(3)/MCL (ref 0–0.9)
EOSINOPHIL NFR BLD AUTO: 0 %
ERYTHROCYTE [DISTWIDTH] IN BLOOD BY AUTOMATED COUNT: 14.6 % (ref 11.5–17)
GFR SERPLBLD CREATININE-BSD FMLA CKD-EPI: >60 MLS/MIN/1.73/M2
GLOBULIN SER-MCNC: 4.3 GM/DL (ref 2.4–3.5)
GLUCOSE SERPL-MCNC: 155 MG/DL (ref 74–100)
HCT VFR BLD AUTO: 37.1 % (ref 37–47)
HGB BLD-MCNC: 11.6 G/DL (ref 12–16)
IMM GRANULOCYTES # BLD AUTO: 0.03 X10(3)/MCL (ref 0–0.04)
IMM GRANULOCYTES NFR BLD AUTO: 0.5 %
LYMPHOCYTES # BLD AUTO: 1.04 X10(3)/MCL (ref 0.6–4.6)
LYMPHOCYTES NFR BLD AUTO: 18.3 %
MAGNESIUM SERPL-MCNC: 1.7 MG/DL (ref 1.6–2.6)
MCH RBC QN AUTO: 28.1 PG (ref 27–31)
MCHC RBC AUTO-ENTMCNC: 31.3 G/DL (ref 33–36)
MCV RBC AUTO: 89.8 FL (ref 80–94)
MONOCYTES # BLD AUTO: 0.57 X10(3)/MCL (ref 0.1–1.3)
MONOCYTES NFR BLD AUTO: 10 %
NEUTROPHILS # BLD AUTO: 4.02 X10(3)/MCL (ref 2.1–9.2)
NEUTROPHILS NFR BLD AUTO: 70.7 %
PHOSPHATE SERPL-MCNC: 1.6 MG/DL (ref 2.3–4.7)
PLATELET # BLD AUTO: 146 X10(3)/MCL (ref 130–400)
PMV BLD AUTO: 11.8 FL (ref 7.4–10.4)
POTASSIUM SERPL-SCNC: 2.7 MMOL/L (ref 3.5–5.1)
PROT SERPL-MCNC: 6.9 GM/DL (ref 6.4–8.3)
RBC # BLD AUTO: 4.13 X10(6)/MCL (ref 4.2–5.4)
SODIUM SERPL-SCNC: 138 MMOL/L (ref 136–145)
WBC # SPEC AUTO: 5.69 X10(3)/MCL (ref 4.5–11.5)

## 2023-10-20 PROCEDURE — C9113 INJ PANTOPRAZOLE SODIUM, VIA: HCPCS | Performed by: FAMILY MEDICINE

## 2023-10-20 PROCEDURE — 25000003 PHARM REV CODE 250: Performed by: FAMILY MEDICINE

## 2023-10-20 PROCEDURE — 63600175 PHARM REV CODE 636 W HCPCS: Performed by: FAMILY MEDICINE

## 2023-10-20 PROCEDURE — 11000001 HC ACUTE MED/SURG PRIVATE ROOM

## 2023-10-20 PROCEDURE — 82553 CREATINE MB FRACTION: CPT | Performed by: FAMILY MEDICINE

## 2023-10-20 PROCEDURE — 83735 ASSAY OF MAGNESIUM: CPT | Performed by: FAMILY MEDICINE

## 2023-10-20 PROCEDURE — 80053 COMPREHEN METABOLIC PANEL: CPT | Performed by: FAMILY MEDICINE

## 2023-10-20 PROCEDURE — 94761 N-INVAS EAR/PLS OXIMETRY MLT: CPT

## 2023-10-20 PROCEDURE — 85025 COMPLETE CBC W/AUTO DIFF WBC: CPT | Performed by: FAMILY MEDICINE

## 2023-10-20 PROCEDURE — A4216 STERILE WATER/SALINE, 10 ML: HCPCS | Performed by: FAMILY MEDICINE

## 2023-10-20 PROCEDURE — 84100 ASSAY OF PHOSPHORUS: CPT | Performed by: FAMILY MEDICINE

## 2023-10-20 RX ORDER — POTASSIUM CHLORIDE 14.9 MG/ML
20 INJECTION INTRAVENOUS ONCE
Status: COMPLETED | OUTPATIENT
Start: 2023-10-20 | End: 2023-10-20

## 2023-10-20 RX ADMIN — ENOXAPARIN SODIUM 40 MG: 40 INJECTION SUBCUTANEOUS at 04:10

## 2023-10-20 RX ADMIN — DULOXETINE 60 MG: 60 CAPSULE, DELAYED RELEASE ORAL at 09:10

## 2023-10-20 RX ADMIN — SODIUM CHLORIDE, PRESERVATIVE FREE 10 ML: 5 INJECTION INTRAVENOUS at 11:10

## 2023-10-20 RX ADMIN — POTASSIUM CHLORIDE 20 MEQ: 14.9 INJECTION, SOLUTION INTRAVENOUS at 10:10

## 2023-10-20 RX ADMIN — PANTOPRAZOLE SODIUM 8 MG/HR: 40 INJECTION, POWDER, FOR SOLUTION INTRAVENOUS at 10:10

## 2023-10-20 RX ADMIN — MUPIROCIN: 20 OINTMENT TOPICAL at 08:10

## 2023-10-20 RX ADMIN — POTASSIUM CHLORIDE 20 MEQ: 1500 TABLET, EXTENDED RELEASE ORAL at 10:10

## 2023-10-20 RX ADMIN — PIPERACILLIN AND TAZOBACTAM 4.5 G: 4; .5 INJECTION, POWDER, LYOPHILIZED, FOR SOLUTION INTRAVENOUS; PARENTERAL at 10:10

## 2023-10-20 RX ADMIN — PIPERACILLIN AND TAZOBACTAM 4.5 G: 4; .5 INJECTION, POWDER, LYOPHILIZED, FOR SOLUTION INTRAVENOUS; PARENTERAL at 02:10

## 2023-10-20 RX ADMIN — POTASSIUM CHLORIDE: 149 INJECTION, SOLUTION, CONCENTRATE INTRAVENOUS at 12:10

## 2023-10-20 RX ADMIN — HYDROCHLOROTHIAZIDE 25 MG: 25 TABLET ORAL at 10:10

## 2023-10-20 RX ADMIN — ACETAMINOPHEN 650 MG: 325 TABLET, FILM COATED ORAL at 10:10

## 2023-10-20 RX ADMIN — POTASSIUM CHLORIDE: 149 INJECTION, SOLUTION, CONCENTRATE INTRAVENOUS at 08:10

## 2023-10-20 RX ADMIN — POTASSIUM CHLORIDE: 149 INJECTION, SOLUTION, CONCENTRATE INTRAVENOUS at 02:10

## 2023-10-20 RX ADMIN — PANTOPRAZOLE SODIUM 8 MG/HR: 40 INJECTION, POWDER, FOR SOLUTION INTRAVENOUS at 03:10

## 2023-10-20 RX ADMIN — VALSARTAN 160 MG: 80 TABLET, FILM COATED ORAL at 09:10

## 2023-10-20 RX ADMIN — QUETIAPINE FUMARATE 25 MG: 25 TABLET ORAL at 08:10

## 2023-10-20 RX ADMIN — MUPIROCIN 1 G: 20 OINTMENT TOPICAL at 10:10

## 2023-10-20 RX ADMIN — PIPERACILLIN AND TAZOBACTAM 4.5 G: 4; .5 INJECTION, POWDER, LYOPHILIZED, FOR SOLUTION INTRAVENOUS; PARENTERAL at 04:10

## 2023-10-20 RX ADMIN — ZOLPIDEM TARTRATE 5 MG: 5 TABLET ORAL at 08:10

## 2023-10-20 RX ADMIN — HYDRALAZINE HYDROCHLORIDE 10 MG: 20 INJECTION INTRAMUSCULAR; INTRAVENOUS at 11:10

## 2023-10-20 RX ADMIN — PIPERACILLIN AND TAZOBACTAM 4.5 G: 4; .5 INJECTION, POWDER, LYOPHILIZED, FOR SOLUTION INTRAVENOUS; PARENTERAL at 11:10

## 2023-10-20 NOTE — PLAN OF CARE
10/20/23 1221   Discharge Assessment   Assessment Type Discharge Planning Assessment   Source of Information patient;family   People in Home spouse   Do you expect to return to your current living situation? Yes   Do you have help at home or someone to help you manage your care at home? Yes   Prior to hospitilization cognitive status: Alert/Oriented;No Deficits   Current cognitive status: Alert/Oriented;No Deficits   Equipment Currently Used at Home none   Do you currently have service(s) that help you manage your care at home? No   Do you take prescription medications? Yes   Do you have prescription coverage? Yes   Do you have any problems affording any of your prescribed medications? No   Is the patient taking medications as prescribed? yes   Who is going to help you get home at discharge? spouse   How do you get to doctors appointments? family or friend will provide   Are you on dialysis? No   Do you take coumadin? No   DME Needed Upon Discharge  none   Discharge Plan discussed with: Spouse/sig other;Patient   Transition of Care Barriers None   Discharge Plan A Home with family   Discharge Plan B Home with family   Physical Activity   On average, how many days per week do you engage in moderate to strenuous exercise (like a brisk walk)? Patient refu   On average, how many minutes do you engage in exercise at this level? Patient refu   Financial Resource Strain   How hard is it for you to pay for the very basics like food, housing, medical care, and heating? Patient refu   Housing Stability   In the last 12 months, was there a time when you were not able to pay the mortgage or rent on time? MS   In the last 12 months, was there a time when you did not have a steady place to sleep or slept in a shelter (including now)? MS   Transportation Needs   In the past 12 months, has lack of transportation kept you from medical appointments or from getting medications? Patient refu   In the past 12 months, has lack of  transportation kept you from meetings, work, or from getting things needed for daily living? Patient refu   Food Insecurity   Within the past 12 months, you worried that your food would run out before you got the money to buy more. Patient refu   Within the past 12 months, the food you bought just didn't last and you didn't have money to get more. Patient refu   Stress   Do you feel stress - tense, restless, nervous, or anxious, or unable to sleep at night because your mind is troubled all the time - these days? Patient refu   Social Connections   In a typical week, how many times do you talk on the phone with family, friends, or neighbors? Patient refu   How often do you get together with friends or relatives? Patient refu   How often do you attend Scientologist or Amish services? Patient refu   Do you belong to any clubs or organizations such as Scientologist groups, unions, fraternal or athletic groups, or school groups? Patient refu   How often do you attend meetings of the clubs or organizations you belong to? Patient refu   Are you , , , , never , or living with a partner? Patient refu   Alcohol Use   Q1: How often do you have a drink containing alcohol? Patient refu   Q2: How many drinks containing alcohol do you have on a typical day when you are drinking? Patient refu   Q3: How often do you have six or more drinks on one occasion? Patient refu

## 2023-10-20 NOTE — ASSESSMENT & PLAN NOTE
All place the patient on IV Zosyn at this time 3.375 q.6 hours.  We will follow her culture results and adjust as necessary also place her on D5 half-normal saline with 20 mEq of KCl.   Ct pending   Gr neg rods on culture

## 2023-10-20 NOTE — PROGRESS NOTES
[facility] Medicine  Progress Note    Patient Name: Aide Arreola  MRN: [id number]  Patient Class: IP- Inpatient   Admission Date: 10/19/2023  Length of Stay: 1 days  Attending Physician: Maik Gill MD  Primary Care Provider: Maik Gill MD        Subjective:     Principal Problem:Pyelonephritis        HPI:  Bed to my office with fever and weakness the patient is a 48-year-old  female known to me from clinic.  She was seen in the ER last night diagnosed with urinary tract infection she has since developed a fever of 101.5 chills rigors.  She had full workup for flu COVID last night which was all within normal limits states today she is having dysuria hematuria she is had some right flank pain and pain to her right inguinal area.  We are admitting the hospital for probable pyelonephritis.  She currently has nausea and has thrown up at least twice.  No other abdominal pain outside of the flank pain mentioned previously.  No coughing no chest pain that she says she is a little short of breath at this time currently oxygen sats are within normal limits.  She is had this has progressed over 3-4 days and culminated today.  Denies any sick contacts any recent travel or anything else out of the ordinary      Overview/Hospital Course:  More alert today, less pain , afebrile,   Ct pending   Urine cx with gram neg rods      Past Medical History:   Diagnosis Date    Anxiety disorder, unspecified     Hypertension        Past Surgical History:   Procedure Laterality Date     SECTION         Review of patient's allergies indicates:  No Known Allergies    No current facility-administered medications on file prior to encounter.     Current Outpatient Medications on File Prior to Encounter   Medication Sig    cefdinir (OMNICEF) 300 MG capsule Take 1 capsule (300 mg total) by mouth 2 (two) times daily. for 10 days    QUEtiapine (SEROQUEL) 25 MG  Tab Take 25 mg by mouth every evening.    zolpidem (AMBIEN) 10 mg Tab Take 10 mg by mouth.    DULoxetine (CYMBALTA) 60 MG capsule Take 60 mg by mouth.    sumatriptan (IMITREX) 100 MG tablet Take 100 mg by mouth.    valsartan-hydrochlorothiazide (DIOVAN-HCT) 160-25 mg per tablet Take 1 tablet by mouth.     Family History       Problem Relation (Age of Onset)    Cancer Father          Tobacco Use    Smoking status: Never    Smokeless tobacco: Never   Substance and Sexual Activity    Alcohol use: Not Currently    Drug use: Not Currently    Sexual activity: Not Currently     Review of Systems   Constitutional:  Positive for chills, fatigue and fever.   HENT: Negative.     Eyes: Negative.    Respiratory:  Positive for shortness of breath. Negative for wheezing.    Cardiovascular: Negative.    Gastrointestinal:  Positive for nausea. Negative for abdominal pain.   Endocrine: Negative.    Genitourinary:  Positive for dysuria, flank pain, frequency and hematuria.   Musculoskeletal:  Positive for arthralgias.   Skin: Negative.    Allergic/Immunologic: Negative.    Neurological: Negative.    Hematological: Negative.    Psychiatric/Behavioral: Negative.       Objective:     Vital Signs (Most Recent):  Temp: 99 °F (37.2 °C) (10/20/23 0730)  Pulse: 86 (10/20/23 0730)  Resp: 20 (10/20/23 0730)  BP: (!) 146/93 (10/20/23 0730)  SpO2: 99 % (10/20/23 0730) Vital Signs (24h Range):  Temp:  [98.9 °F (37.2 °C)-100.3 °F (37.9 °C)] 99 °F (37.2 °C)  Pulse:  [86-95] 86  Resp:  [18-20] 20  SpO2:  [94 %-100 %] 99 %  BP: ()/(62-98) 146/93     Weight: (!) 145.2 kg (320 lb)  Body mass index is 54.93 kg/m².     Physical Exam  Constitutional:       General: She is not in acute distress.     Appearance: She is obese.   HENT:      Head: Normocephalic and atraumatic.      Right Ear: Tympanic membrane normal.      Left Ear: Tympanic membrane normal.      Nose: Nose normal.      Mouth/Throat:      Mouth: Mucous membranes are moist.       Pharynx: Oropharynx is clear.   Eyes:      Extraocular Movements: Extraocular movements intact.      Pupils: Pupils are equal, round, and reactive to light.   Cardiovascular:      Rate and Rhythm: Normal rate and regular rhythm.      Pulses: Normal pulses.      Heart sounds: Normal heart sounds. No murmur heard.     No friction rub.   Pulmonary:      Effort: Pulmonary effort is normal. No respiratory distress.      Breath sounds: Normal breath sounds. No stridor.   Abdominal:      General: Abdomen is flat. Bowel sounds are normal.      Palpations: Abdomen is soft.      Comments: Pain to the right flank and right inguinal area to palpation   Genitourinary:     General: Normal vulva.   Musculoskeletal:         General: No swelling or tenderness. Normal range of motion.      Cervical back: Normal range of motion and neck supple.   Skin:     General: Skin is warm and dry.   Neurological:      General: No focal deficit present.      Mental Status: She is alert and oriented to person, place, and time. Mental status is at baseline.   Psychiatric:         Mood and Affect: Mood normal.         Behavior: Behavior normal.         Thought Content: Thought content normal.         Judgment: Judgment normal.              CRANIAL NERVES     CN III, IV, VI   Pupils are equal, round, and reactive to light.       Significant Labs: All pertinent labs within the past 24 hours have been reviewed.    Significant Imaging: I have reviewed all pertinent imaging results/findings within the past 24 hours.      Assessment/Plan:      * Pyelonephritis  All place the patient on IV Zosyn at this time 3.375 q.6 hours.  We will follow her culture results and adjust as necessary also place her on D5 half-normal saline with 20 mEq of KCl.   Ct pending   Gr neg rods on culture    Hypokalemia    Replete with d5 1/2 with kcl , po and ernestine now    Shortness of breath  Chest x-ray is within normal limits at this time we will continue to monitor  closely      Morbid obesity  Body mass index is 54.93 kg/m². Morbid obesity complicates all aspects of disease management from diagnostic modalities to treatment. Weight loss encouraged and health benefits explained to patient.         Hypertension  Continue home meds as written stable at this time      Depressive disorder  Continue home meds as written    Nausea  Continue Zofran as necessary      Weakness  Likely due to her pyelonephritis.  We will get CT scan to be sure that there is not a stone present or any other further abdominal pathology.      We will follow chemistries and adjust as necessary.        VTE Risk Mitigation (From admission, onward)         Ordered     enoxaparin injection 40 mg  Every 24 hours         10/19/23 1508     IP VTE LOW RISK PATIENT  Once         10/19/23 1508     Place sequential compression device  Until discontinued         10/19/23 1508                Discharge Planning   CRISTHIAN:      Code Status: Full Code   Is the patient medically ready for discharge?:     Reason for patient still in hospital (select all that apply): Patient trending condition                     Maik Gill MD  Department of Hospital Medicine   [facility]

## 2023-10-20 NOTE — HOSPITAL COURSE
Patient is a 48-year-old  female who presented to the clinic with signs of urinary tract infection severe weakness and what appeared to be some altered mental status.  Was admitted and noted to have pyelonephritis.  Cultures grew out E coli.  Initially she was on Zosyn which appeared to be making some progress surgery coli came back pansensitive Dr. King changed her to Levaquin.  She has done well on Levaquin decrease in her white blood cell count she has had only minimal elevation of her blood pressure during her stay but this has been mostly controlled.  She did have some notable hypokalemia.  We have been replacing her potassium IV and orally.  Currently she says her weakness has improved she is not having any pain or nausea and vomiting.

## 2023-10-20 NOTE — SUBJECTIVE & OBJECTIVE
Past Medical History:   Diagnosis Date    Anxiety disorder, unspecified     Hypertension        Past Surgical History:   Procedure Laterality Date     SECTION         Review of patient's allergies indicates:  No Known Allergies    No current facility-administered medications on file prior to encounter.     Current Outpatient Medications on File Prior to Encounter   Medication Sig    cefdinir (OMNICEF) 300 MG capsule Take 1 capsule (300 mg total) by mouth 2 (two) times daily. for 10 days    QUEtiapine (SEROQUEL) 25 MG Tab Take 25 mg by mouth every evening.    zolpidem (AMBIEN) 10 mg Tab Take 10 mg by mouth.    DULoxetine (CYMBALTA) 60 MG capsule Take 60 mg by mouth.    sumatriptan (IMITREX) 100 MG tablet Take 100 mg by mouth.    valsartan-hydrochlorothiazide (DIOVAN-HCT) 160-25 mg per tablet Take 1 tablet by mouth.     Family History       Problem Relation (Age of Onset)    Cancer Father          Tobacco Use    Smoking status: Never    Smokeless tobacco: Never   Substance and Sexual Activity    Alcohol use: Not Currently    Drug use: Not Currently    Sexual activity: Not Currently     Review of Systems   Constitutional:  Positive for chills, fatigue and fever.   HENT: Negative.     Eyes: Negative.    Respiratory:  Positive for shortness of breath. Negative for wheezing.    Cardiovascular: Negative.    Gastrointestinal:  Positive for nausea. Negative for abdominal pain.   Endocrine: Negative.    Genitourinary:  Positive for dysuria, flank pain, frequency and hematuria.   Musculoskeletal:  Positive for arthralgias.   Skin: Negative.    Allergic/Immunologic: Negative.    Neurological: Negative.    Hematological: Negative.    Psychiatric/Behavioral: Negative.       Objective:     Vital Signs (Most Recent):  Temp: 99 °F (37.2 °C) (10/20/23 0730)  Pulse: 86 (10/20/23 0730)  Resp: 20 (10/20/23 0730)  BP: (!) 146/93 (10/20/23 0730)  SpO2: 99 % (10/20/23 0730) Vital Signs (24h Range):  Temp:  [98.9 °F (37.2 °C)-100.3  °F (37.9 °C)] 99 °F (37.2 °C)  Pulse:  [86-95] 86  Resp:  [18-20] 20  SpO2:  [94 %-100 %] 99 %  BP: ()/(62-98) 146/93     Weight: (!) 145.2 kg (320 lb)  Body mass index is 54.93 kg/m².     Physical Exam  Constitutional:       General: She is not in acute distress.     Appearance: She is obese.   HENT:      Head: Normocephalic and atraumatic.      Right Ear: Tympanic membrane normal.      Left Ear: Tympanic membrane normal.      Nose: Nose normal.      Mouth/Throat:      Mouth: Mucous membranes are moist.      Pharynx: Oropharynx is clear.   Eyes:      Extraocular Movements: Extraocular movements intact.      Pupils: Pupils are equal, round, and reactive to light.   Cardiovascular:      Rate and Rhythm: Normal rate and regular rhythm.      Pulses: Normal pulses.      Heart sounds: Normal heart sounds. No murmur heard.     No friction rub.   Pulmonary:      Effort: Pulmonary effort is normal. No respiratory distress.      Breath sounds: Normal breath sounds. No stridor.   Abdominal:      General: Abdomen is flat. Bowel sounds are normal.      Palpations: Abdomen is soft.      Comments: Pain to the right flank and right inguinal area to palpation   Genitourinary:     General: Normal vulva.   Musculoskeletal:         General: No swelling or tenderness. Normal range of motion.      Cervical back: Normal range of motion and neck supple.   Skin:     General: Skin is warm and dry.   Neurological:      General: No focal deficit present.      Mental Status: She is alert and oriented to person, place, and time. Mental status is at baseline.   Psychiatric:         Mood and Affect: Mood normal.         Behavior: Behavior normal.         Thought Content: Thought content normal.         Judgment: Judgment normal.              CRANIAL NERVES     CN III, IV, VI   Pupils are equal, round, and reactive to light.       Significant Labs: All pertinent labs within the past 24 hours have been reviewed.    Significant Imaging: I have  reviewed all pertinent imaging results/findings within the past 24 hours.

## 2023-10-20 NOTE — PLAN OF CARE
Problem: Adult Inpatient Plan of Care  Goal: Plan of Care Review  Outcome: Ongoing, Progressing  Goal: Patient-Specific Goal (Individualized)  Outcome: Ongoing, Progressing  Goal: Absence of Hospital-Acquired Illness or Injury  Outcome: Ongoing, Progressing  Goal: Optimal Comfort and Wellbeing  Outcome: Ongoing, Progressing  Goal: Readiness for Transition of Care  Outcome: Ongoing, Progressing     Problem: Bariatric Environmental Safety  Goal: Safety Maintained with Care  Outcome: Ongoing, Progressing     Problem: Infection  Goal: Absence of Infection Signs and Symptoms  Outcome: Ongoing, Progressing     Problem: UTI (Urinary Tract Infection)  Goal: Improved Infection Symptoms  Outcome: Ongoing, Progressing     Problem: Fever  Goal: Body Temperature in Desired Range  Outcome: Ongoing, Progressing     Problem: Behavioral Health Comorbidity  Goal: Maintenance of Behavioral Health Symptom Control  Outcome: Ongoing, Progressing     Problem: Hypertension Comorbidity  Goal: Blood Pressure in Desired Range  Outcome: Ongoing, Progressing     Problem: Sleep Disturbance  Goal: Adequate Sleep/Rest  Outcome: Ongoing, Progressing     Problem: Activity and Energy Impairment (Anxiety Signs/Symptoms)  Goal: Optimized Energy Level (Anxiety Signs/Symptoms)  Outcome: Ongoing, Progressing     Problem: Cognitive Impairment (Anxiety Signs/Symptoms)  Goal: Optimized Cognitive Function (Anxiety Signs/Symptoms)  Outcome: Ongoing, Progressing     Problem: Mood Impairment (Anxiety Signs/Symptoms)  Goal: Improved Mood Symptoms (Anxiety Signs/Symptoms)  Outcome: Ongoing, Progressing     Problem: Sleep Impairment (Anxiety Signs/Symptoms)  Goal: Improved Sleep (Anxiety Signs/Symptoms)  Outcome: Ongoing, Progressing     Problem: Social, Occupational or Functional Impairment (Anxiety Signs/Symptoms)  Goal: Enhanced Social, Occupational or Functional Skills (Anxiety Signs/Symptoms)  Outcome: Ongoing, Progressing     Problem: Somatic Disturbance  (Anxiety Signs/Symptoms)  Goal: Improved Somatic Symptoms (Anxiety Signs/Symptoms)  Outcome: Ongoing, Progressing

## 2023-10-21 LAB
ALBUMIN SERPL-MCNC: 2.5 G/DL (ref 3.5–5)
ALBUMIN/GLOB SERPL: 0.6 RATIO (ref 1.1–2)
ALP SERPL-CCNC: 80 UNIT/L (ref 40–150)
ALT SERPL-CCNC: 23 UNIT/L (ref 0–55)
AST SERPL-CCNC: 40 UNIT/L (ref 5–34)
BASOPHILS # BLD AUTO: 0.02 X10(3)/MCL
BASOPHILS NFR BLD AUTO: 0.5 %
BILIRUB SERPL-MCNC: 0.6 MG/DL
BUN SERPL-MCNC: 4 MG/DL (ref 7–18.7)
CALCIUM SERPL-MCNC: 8.3 MG/DL (ref 8.4–10.2)
CHLORIDE SERPL-SCNC: 102 MMOL/L (ref 98–107)
CO2 SERPL-SCNC: 25 MMOL/L (ref 22–29)
CREAT SERPL-MCNC: 0.82 MG/DL (ref 0.55–1.02)
EOSINOPHIL # BLD AUTO: 0 X10(3)/MCL (ref 0–0.9)
EOSINOPHIL NFR BLD AUTO: 0 %
ERYTHROCYTE [DISTWIDTH] IN BLOOD BY AUTOMATED COUNT: 14.9 % (ref 11.5–17)
GFR SERPLBLD CREATININE-BSD FMLA CKD-EPI: >60 MLS/MIN/1.73/M2
GLOBULIN SER-MCNC: 4.2 GM/DL (ref 2.4–3.5)
GLUCOSE SERPL-MCNC: 153 MG/DL (ref 74–100)
HCT VFR BLD AUTO: 34.7 % (ref 37–47)
HGB BLD-MCNC: 11.1 G/DL (ref 12–16)
IMM GRANULOCYTES # BLD AUTO: 0.03 X10(3)/MCL (ref 0–0.04)
IMM GRANULOCYTES NFR BLD AUTO: 0.7 %
LYMPHOCYTES # BLD AUTO: 1.43 X10(3)/MCL (ref 0.6–4.6)
LYMPHOCYTES NFR BLD AUTO: 32.3 %
MAGNESIUM SERPL-MCNC: 1.8 MG/DL (ref 1.6–2.6)
MCH RBC QN AUTO: 28.1 PG (ref 27–31)
MCHC RBC AUTO-ENTMCNC: 32 G/DL (ref 33–36)
MCV RBC AUTO: 87.8 FL (ref 80–94)
MONOCYTES # BLD AUTO: 0.68 X10(3)/MCL (ref 0.1–1.3)
MONOCYTES NFR BLD AUTO: 15.3 %
NEUTROPHILS # BLD AUTO: 2.27 X10(3)/MCL (ref 2.1–9.2)
NEUTROPHILS NFR BLD AUTO: 51.2 %
PHOSPHATE SERPL-MCNC: 1.4 MG/DL (ref 2.3–4.7)
PLATELET # BLD AUTO: 184 X10(3)/MCL (ref 130–400)
PMV BLD AUTO: 11.9 FL (ref 7.4–10.4)
POTASSIUM SERPL-SCNC: 2.8 MMOL/L (ref 3.5–5.1)
PROT SERPL-MCNC: 6.7 GM/DL (ref 6.4–8.3)
RBC # BLD AUTO: 3.95 X10(6)/MCL (ref 4.2–5.4)
SODIUM SERPL-SCNC: 136 MMOL/L (ref 136–145)
WBC # SPEC AUTO: 4.43 X10(3)/MCL (ref 4.5–11.5)

## 2023-10-21 PROCEDURE — A4216 STERILE WATER/SALINE, 10 ML: HCPCS | Performed by: FAMILY MEDICINE

## 2023-10-21 PROCEDURE — 63600175 PHARM REV CODE 636 W HCPCS: Performed by: INTERNAL MEDICINE

## 2023-10-21 PROCEDURE — 63600175 PHARM REV CODE 636 W HCPCS: Performed by: FAMILY MEDICINE

## 2023-10-21 PROCEDURE — C9113 INJ PANTOPRAZOLE SODIUM, VIA: HCPCS | Performed by: FAMILY MEDICINE

## 2023-10-21 PROCEDURE — 83735 ASSAY OF MAGNESIUM: CPT | Performed by: FAMILY MEDICINE

## 2023-10-21 PROCEDURE — 11000001 HC ACUTE MED/SURG PRIVATE ROOM

## 2023-10-21 PROCEDURE — 25000003 PHARM REV CODE 250: Performed by: INTERNAL MEDICINE

## 2023-10-21 PROCEDURE — 94761 N-INVAS EAR/PLS OXIMETRY MLT: CPT

## 2023-10-21 PROCEDURE — 84100 ASSAY OF PHOSPHORUS: CPT | Performed by: FAMILY MEDICINE

## 2023-10-21 PROCEDURE — 85025 COMPLETE CBC W/AUTO DIFF WBC: CPT | Performed by: FAMILY MEDICINE

## 2023-10-21 PROCEDURE — 25000003 PHARM REV CODE 250: Performed by: FAMILY MEDICINE

## 2023-10-21 PROCEDURE — 80053 COMPREHEN METABOLIC PANEL: CPT | Performed by: FAMILY MEDICINE

## 2023-10-21 RX ORDER — LEVOFLOXACIN 5 MG/ML
750 INJECTION, SOLUTION INTRAVENOUS
Status: DISCONTINUED | OUTPATIENT
Start: 2023-10-21 | End: 2023-10-23 | Stop reason: HOSPADM

## 2023-10-21 RX ORDER — PANTOPRAZOLE SODIUM 40 MG/1
40 TABLET, DELAYED RELEASE ORAL DAILY
Status: DISCONTINUED | OUTPATIENT
Start: 2023-10-22 | End: 2023-10-23 | Stop reason: HOSPADM

## 2023-10-21 RX ADMIN — POTASSIUM CHLORIDE: 149 INJECTION, SOLUTION, CONCENTRATE INTRAVENOUS at 04:10

## 2023-10-21 RX ADMIN — HYDROCHLOROTHIAZIDE 25 MG: 25 TABLET ORAL at 09:10

## 2023-10-21 RX ADMIN — MUPIROCIN 1 G: 20 OINTMENT TOPICAL at 11:10

## 2023-10-21 RX ADMIN — DULOXETINE 60 MG: 60 CAPSULE, DELAYED RELEASE ORAL at 09:10

## 2023-10-21 RX ADMIN — QUETIAPINE FUMARATE 25 MG: 25 TABLET ORAL at 09:10

## 2023-10-21 RX ADMIN — SODIUM CHLORIDE, PRESERVATIVE FREE 10 ML: 5 INJECTION INTRAVENOUS at 01:10

## 2023-10-21 RX ADMIN — MUPIROCIN 1 G: 20 OINTMENT TOPICAL at 09:10

## 2023-10-21 RX ADMIN — ACETAMINOPHEN 650 MG: 325 TABLET, FILM COATED ORAL at 02:10

## 2023-10-21 RX ADMIN — PANTOPRAZOLE SODIUM 40 MG: 40 INJECTION, POWDER, FOR SOLUTION INTRAVENOUS at 11:10

## 2023-10-21 RX ADMIN — ENOXAPARIN SODIUM 40 MG: 40 INJECTION SUBCUTANEOUS at 05:10

## 2023-10-21 RX ADMIN — VALSARTAN 160 MG: 80 TABLET, FILM COATED ORAL at 09:10

## 2023-10-21 RX ADMIN — SODIUM CHLORIDE, PRESERVATIVE FREE 10 ML: 5 INJECTION INTRAVENOUS at 05:10

## 2023-10-21 RX ADMIN — LEVOFLOXACIN 750 MG: 750 INJECTION, SOLUTION INTRAVENOUS at 05:10

## 2023-10-21 RX ADMIN — PIPERACILLIN AND TAZOBACTAM 4.5 G: 4; .5 INJECTION, POWDER, LYOPHILIZED, FOR SOLUTION INTRAVENOUS; PARENTERAL at 09:10

## 2023-10-21 RX ADMIN — HYDRALAZINE HYDROCHLORIDE 10 MG: 20 INJECTION INTRAMUSCULAR; INTRAVENOUS at 10:10

## 2023-10-21 RX ADMIN — POTASSIUM CHLORIDE 20 MEQ: 1500 TABLET, EXTENDED RELEASE ORAL at 09:10

## 2023-10-21 RX ADMIN — ZOLPIDEM TARTRATE 5 MG: 5 TABLET ORAL at 09:10

## 2023-10-21 RX ADMIN — POTASSIUM CHLORIDE: 149 INJECTION, SOLUTION, CONCENTRATE INTRAVENOUS at 01:10

## 2023-10-21 RX ADMIN — ACETAMINOPHEN 650 MG: 325 TABLET, FILM COATED ORAL at 09:10

## 2023-10-21 RX ADMIN — POTASSIUM PHOSPHATE, MONOBASIC AND POTASSIUM PHOSPHATE, DIBASIC 20 MMOL: 224; 236 INJECTION, SOLUTION, CONCENTRATE INTRAVENOUS at 05:10

## 2023-10-21 RX ADMIN — ACETAMINOPHEN 650 MG: 325 TABLET, FILM COATED ORAL at 05:10

## 2023-10-21 NOTE — PROGRESS NOTES
Pharmacist Intervention IV to PO Note    Aide Arreola is a 48 y.o. female being treated with IV medication pantoprazole    Patient Data:    Vital Signs (Most Recent):  Temp: 98.3 °F (36.8 °C) (10/21/23 0915)  Pulse: 74 (10/21/23 0915)  Resp: 20 (10/21/23 0800)  BP: (!) 146/80 (10/21/23 0915)  SpO2: 99 % (10/21/23 0915) Vital Signs (72h Range):  Temp:  [98.3 °F (36.8 °C)-100.3 °F (37.9 °C)]   Pulse:  [64-95]   Resp:  [18-20]   BP: ()/(62-98)   SpO2:  [93 %-100 %]      CBC:  Recent Labs   Lab 10/19/23  1633 10/20/23  0040 10/21/23  0339   WBC 7.89 5.69 4.43*   RBC 4.31 4.13* 3.95*   HGB 12.1 11.6* 11.1*   HCT 36.5* 37.1 34.7*    146 184   MCV 84.7 89.8 87.8   MCH 28.1 28.1 28.1   MCHC 33.2 31.3* 32.0*     CMP:     Recent Labs   Lab 10/19/23  1633 10/20/23  0040 10/21/23  0339   CALCIUM 8.8 8.5 8.3*   ALBUMIN 2.9* 2.6* 2.5*    138 136   K 2.6* 2.7* 2.8*   CO2 23 23 25   BUN 5.0* 5.0* 4.0*   CREATININE 0.83 0.87 0.82   ALKPHOS 87 79 80   ALT 14 15 23   AST 26 31 40*   BILITOT 0.9 0.7 0.6       Dietary Orders:  Diet Orders            Diet heart healthy: Heart Healthy starting at 10/19 1703            Based on the following criteria, this patient qualifies for intravenous to oral conversion:  [x] The patients gastrointestinal tract is functioning (tolerating medications via oral or enteral route for 24 hours and tolerating food or enteral feeds for 24 hours).      IV medication pantoprazole will be changed to oral medication pantoprazole    Pharmacist's Name: Fercho Verduzco  Pharmacist's Extension: [phone]

## 2023-10-22 LAB
ALBUMIN SERPL-MCNC: 2.5 G/DL (ref 3.5–5)
ALBUMIN/GLOB SERPL: 0.6 RATIO (ref 1.1–2)
ALP SERPL-CCNC: 80 UNIT/L (ref 40–150)
ALT SERPL-CCNC: 22 UNIT/L (ref 0–55)
AST SERPL-CCNC: 34 UNIT/L (ref 5–34)
BACTERIA UR CULT: NORMAL
BASOPHILS # BLD AUTO: 0.02 X10(3)/MCL
BASOPHILS NFR BLD AUTO: 0.4 %
BILIRUB SERPL-MCNC: 0.3 MG/DL
BUN SERPL-MCNC: 4 MG/DL (ref 7–18.7)
CALCIUM SERPL-MCNC: 8.7 MG/DL (ref 8.4–10.2)
CHLORIDE SERPL-SCNC: 102 MMOL/L (ref 98–107)
CO2 SERPL-SCNC: 26 MMOL/L (ref 22–29)
CREAT SERPL-MCNC: 0.76 MG/DL (ref 0.55–1.02)
EOSINOPHIL # BLD AUTO: 0.03 X10(3)/MCL (ref 0–0.9)
EOSINOPHIL NFR BLD AUTO: 0.6 %
ERYTHROCYTE [DISTWIDTH] IN BLOOD BY AUTOMATED COUNT: 15.2 % (ref 11.5–17)
GFR SERPLBLD CREATININE-BSD FMLA CKD-EPI: >60 MLS/MIN/1.73/M2
GLOBULIN SER-MCNC: 4.2 GM/DL (ref 2.4–3.5)
GLUCOSE SERPL-MCNC: 111 MG/DL (ref 74–100)
HCT VFR BLD AUTO: 35.3 % (ref 37–47)
HGB BLD-MCNC: 11.1 G/DL (ref 12–16)
IMM GRANULOCYTES # BLD AUTO: 0.01 X10(3)/MCL (ref 0–0.04)
IMM GRANULOCYTES NFR BLD AUTO: 0.2 %
LYMPHOCYTES # BLD AUTO: 2.25 X10(3)/MCL (ref 0.6–4.6)
LYMPHOCYTES NFR BLD AUTO: 46.8 %
MAGNESIUM SERPL-MCNC: 1.9 MG/DL (ref 1.6–2.6)
MCH RBC QN AUTO: 27.3 PG (ref 27–31)
MCHC RBC AUTO-ENTMCNC: 31.4 G/DL (ref 33–36)
MCV RBC AUTO: 86.9 FL (ref 80–94)
MONOCYTES # BLD AUTO: 0.75 X10(3)/MCL (ref 0.1–1.3)
MONOCYTES NFR BLD AUTO: 15.6 %
NEUTROPHILS # BLD AUTO: 1.75 X10(3)/MCL (ref 2.1–9.2)
NEUTROPHILS NFR BLD AUTO: 36.4 %
PHOSPHATE SERPL-MCNC: 2.7 MG/DL (ref 2.3–4.7)
PLATELET # BLD AUTO: 233 X10(3)/MCL (ref 130–400)
PMV BLD AUTO: 12 FL (ref 7.4–10.4)
POTASSIUM SERPL-SCNC: 3.2 MMOL/L (ref 3.5–5.1)
PROT SERPL-MCNC: 6.7 GM/DL (ref 6.4–8.3)
RBC # BLD AUTO: 4.06 X10(6)/MCL (ref 4.2–5.4)
SODIUM SERPL-SCNC: 139 MMOL/L (ref 136–145)
WBC # SPEC AUTO: 4.81 X10(3)/MCL (ref 4.5–11.5)

## 2023-10-22 PROCEDURE — 85025 COMPLETE CBC W/AUTO DIFF WBC: CPT | Performed by: FAMILY MEDICINE

## 2023-10-22 PROCEDURE — 63600175 PHARM REV CODE 636 W HCPCS: Performed by: INTERNAL MEDICINE

## 2023-10-22 PROCEDURE — 25000003 PHARM REV CODE 250: Performed by: FAMILY MEDICINE

## 2023-10-22 PROCEDURE — 83735 ASSAY OF MAGNESIUM: CPT | Performed by: FAMILY MEDICINE

## 2023-10-22 PROCEDURE — 11000001 HC ACUTE MED/SURG PRIVATE ROOM

## 2023-10-22 PROCEDURE — 84100 ASSAY OF PHOSPHORUS: CPT | Performed by: FAMILY MEDICINE

## 2023-10-22 PROCEDURE — 80053 COMPREHEN METABOLIC PANEL: CPT | Performed by: FAMILY MEDICINE

## 2023-10-22 PROCEDURE — 63600175 PHARM REV CODE 636 W HCPCS: Performed by: FAMILY MEDICINE

## 2023-10-22 PROCEDURE — A4216 STERILE WATER/SALINE, 10 ML: HCPCS | Performed by: FAMILY MEDICINE

## 2023-10-22 PROCEDURE — 25000003 PHARM REV CODE 250: Performed by: INTERNAL MEDICINE

## 2023-10-22 PROCEDURE — 94761 N-INVAS EAR/PLS OXIMETRY MLT: CPT

## 2023-10-22 RX ORDER — SUMATRIPTAN 50 MG/1
100 TABLET, FILM COATED ORAL ONCE
Status: COMPLETED | OUTPATIENT
Start: 2023-10-22 | End: 2023-10-22

## 2023-10-22 RX ORDER — POTASSIUM CHLORIDE 20 MEQ/1
20 TABLET, EXTENDED RELEASE ORAL ONCE
Status: COMPLETED | OUTPATIENT
Start: 2023-10-22 | End: 2023-10-22

## 2023-10-22 RX ADMIN — ACETAMINOPHEN 650 MG: 325 TABLET, FILM COATED ORAL at 08:10

## 2023-10-22 RX ADMIN — POTASSIUM CHLORIDE: 149 INJECTION, SOLUTION, CONCENTRATE INTRAVENOUS at 09:10

## 2023-10-22 RX ADMIN — POTASSIUM CHLORIDE: 149 INJECTION, SOLUTION, CONCENTRATE INTRAVENOUS at 11:10

## 2023-10-22 RX ADMIN — PANTOPRAZOLE SODIUM 40 MG: 40 TABLET, DELAYED RELEASE ORAL at 08:10

## 2023-10-22 RX ADMIN — DULOXETINE 60 MG: 60 CAPSULE, DELAYED RELEASE ORAL at 08:10

## 2023-10-22 RX ADMIN — ENOXAPARIN SODIUM 40 MG: 40 INJECTION SUBCUTANEOUS at 05:10

## 2023-10-22 RX ADMIN — QUETIAPINE FUMARATE 25 MG: 25 TABLET ORAL at 08:10

## 2023-10-22 RX ADMIN — POTASSIUM CHLORIDE 20 MEQ: 1500 TABLET, EXTENDED RELEASE ORAL at 04:10

## 2023-10-22 RX ADMIN — SODIUM CHLORIDE, PRESERVATIVE FREE 10 ML: 5 INJECTION INTRAVENOUS at 11:10

## 2023-10-22 RX ADMIN — POTASSIUM CHLORIDE: 149 INJECTION, SOLUTION, CONCENTRATE INTRAVENOUS at 02:10

## 2023-10-22 RX ADMIN — POTASSIUM CHLORIDE 20 MEQ: 1500 TABLET, EXTENDED RELEASE ORAL at 08:10

## 2023-10-22 RX ADMIN — VALSARTAN 160 MG: 80 TABLET, FILM COATED ORAL at 08:10

## 2023-10-22 RX ADMIN — HYDROCHLOROTHIAZIDE 25 MG: 25 TABLET ORAL at 08:10

## 2023-10-22 RX ADMIN — MUPIROCIN 1 G: 20 OINTMENT TOPICAL at 09:10

## 2023-10-22 RX ADMIN — MUPIROCIN 1 G: 20 OINTMENT TOPICAL at 08:10

## 2023-10-22 RX ADMIN — ZOLPIDEM TARTRATE 5 MG: 5 TABLET ORAL at 08:10

## 2023-10-22 RX ADMIN — SODIUM CHLORIDE, PRESERVATIVE FREE 10 ML: 5 INJECTION INTRAVENOUS at 05:10

## 2023-10-22 RX ADMIN — SUMATRIPTAN SUCCINATE 100 MG: 50 TABLET ORAL at 06:10

## 2023-10-22 RX ADMIN — ACETAMINOPHEN 650 MG: 325 TABLET, FILM COATED ORAL at 07:10

## 2023-10-22 RX ADMIN — LEVOFLOXACIN 750 MG: 750 INJECTION, SOLUTION INTRAVENOUS at 05:10

## 2023-10-22 RX ADMIN — SUMATRIPTAN SUCCINATE 100 MG: 50 TABLET ORAL at 04:10

## 2023-10-22 NOTE — PROGRESS NOTES
[facility]                     [address] [address]    PATIENT NAME:       DENVER MONTES   YOB: 1975  CSN:                [id number]   MRN:                [id number]  ADMIT DATE:         10/19/2023 15:22:00  PHYSICIAN:          Alexandru Zapata MD                            PROGRESS NOTE    DATE:      CODE STATUS:  Full code.    SUBJECTIVE:  The patient was visited in the room.  Her  is also present   there and a very pleasant lady, who has been admitted for acute pyelonephritis   and gram-negative rods were grown into the urine and the final identification   has come back to E coli.  It is sensitive to Levaquin and I have changed the   antibiotic from Zosyn to Levaquin intravenous at 750 mg.  I am going to see how   the patient behaves with that.  She had a fever of 101.5 with chills and rigor   upon admission.  Otherwise, patient just has a concomitant diagnosis of   hypertension and anxiety.  The patient is doing very well.  I did tell her that   if all is well, either the patient can be discharged tomorrow or day after   tomorrow depending on her white cell count and depending on whether she spikes   any fever or not and the antibiotics have changed and she understands that.    OBJECTIVE:  VITAL SIGNS:  As follows; she is afebrile at 97.8, O2 sat is 99%,   blood pressure 158/98, pulse 81.  HEENT:  Head normocephalic, pupils bilaterally react to light and equal in size.    Mild conjunctival pallor.  No scleral icterus.  Trachea is midline.  CHEST:  Good bilateral air entry.  CVS:  First and second heart sounds are heard normally without any murmurs.  ABDOMEN:  Soft, nontender.  EXTREMITIES:  Devoid of edema, cyanosis or clubbing.    LABS AND INVESTIGATIONS:  WBC 4.43, hemoglobin 11.1, hematocrit 34.7, MCV 87.8,   platelet count 184.  Sodium 136, potassium 2.8, chloride 102, bicarb 25, BUN   4.0,  creatinine 0.82, GFR of more than 60, glucose 153, calcium 8.3, phosphorus   is at 1.4, magnesium is 1.80.    IMPRESSION:    1. Pyelonephritis.  Offending pathogen on final results of the microbiology is E   coli and it is pansensitive and it is not extended spectrum beta-lactamase.    Antibiotics changed to levofloxacin 750 IV piggyback daily.  2. Hypokalemia.  3. Hypophosphatemia.  4. History of hypertension.  5. History of insomnia.    PLAN:  Change his antibiotics to Levaquin.  Supplement the potassium.    Supplement the phosphorus.  Follow with the labs in the morning. If possible, we   will change the Levaquin to p.o. tomorrow and see if the patient does not have   any fever or no rise in white cell count.  If she feels well, probably can be   discharged tomorrow evening or day after tomorrow, Monday morning when Dr. Gill comes back.    Pleasure taking care of Ms. Maxwell Noble during her stay at [facility].        ______________________________  MD SHERIDAN Rosenberg/ABDIAZIZ  DD:  10/21/2023  Time:  08:01PM  DT:  10/21/2023  Time:  10:26PM  Job #:  [id number]      PROGRESS NOTE

## 2023-10-23 VITALS
SYSTOLIC BLOOD PRESSURE: 130 MMHG | BODY MASS INDEX: 50.02 KG/M2 | RESPIRATION RATE: 18 BRPM | WEIGHT: 293 LBS | HEART RATE: 71 BPM | DIASTOLIC BLOOD PRESSURE: 82 MMHG | OXYGEN SATURATION: 97 % | TEMPERATURE: 98 F | HEIGHT: 64 IN

## 2023-10-23 LAB
ABS NEUT CALC (OHS): 2.79 X10(3)/MCL (ref 2.1–9.2)
ANION GAP SERPL CALC-SCNC: 9 MEQ/L
BUN SERPL-MCNC: 6 MG/DL (ref 7–18.7)
CALCIUM SERPL-MCNC: 8.8 MG/DL (ref 8.4–10.2)
CHLORIDE SERPL-SCNC: 103 MMOL/L (ref 98–107)
CO2 SERPL-SCNC: 27 MMOL/L (ref 22–29)
CREAT SERPL-MCNC: 0.76 MG/DL (ref 0.55–1.02)
CREAT/UREA NIT SERPL: 8
EOSINOPHIL NFR BLD MANUAL: 0.12 X10(3)/MCL (ref 0–0.9)
EOSINOPHIL NFR BLD MANUAL: 2 % (ref 0–8)
ERYTHROCYTE [DISTWIDTH] IN BLOOD BY AUTOMATED COUNT: 15.5 % (ref 11.5–17)
GFR SERPLBLD CREATININE-BSD FMLA CKD-EPI: >60 MLS/MIN/1.73/M2
GLUCOSE SERPL-MCNC: 146 MG/DL (ref 74–100)
HCT VFR BLD AUTO: 34.8 % (ref 37–47)
HGB BLD-MCNC: 10.8 G/DL (ref 12–16)
LYMPHOCYTES NFR BLD MANUAL: 2.72 X10(3)/MCL
LYMPHOCYTES NFR BLD MANUAL: 44 % (ref 13–40)
MAGNESIUM SERPL-MCNC: 1.9 MG/DL (ref 1.6–2.6)
MCH RBC QN AUTO: 27.6 PG (ref 27–31)
MCHC RBC AUTO-ENTMCNC: 31 G/DL (ref 33–36)
MCV RBC AUTO: 88.8 FL (ref 80–94)
MONOCYTES NFR BLD MANUAL: 0.56 X10(3)/MCL (ref 0.1–1.3)
MONOCYTES NFR BLD MANUAL: 9 % (ref 2–11)
NEUTROPHILS NFR BLD MANUAL: 45 % (ref 47–80)
PHOSPHATE SERPL-MCNC: 2.6 MG/DL (ref 2.3–4.7)
PLATELET # BLD AUTO: 272 X10(3)/MCL (ref 130–400)
PLATELET # BLD EST: NORMAL 10*3/UL
PMV BLD AUTO: 11.9 FL (ref 7.4–10.4)
POTASSIUM SERPL-SCNC: 3.3 MMOL/L (ref 3.5–5.1)
RBC # BLD AUTO: 3.92 X10(6)/MCL (ref 4.2–5.4)
RBC MORPH BLD: NORMAL
SODIUM SERPL-SCNC: 139 MMOL/L (ref 136–145)
WBC # SPEC AUTO: 6.19 X10(3)/MCL (ref 4.5–11.5)

## 2023-10-23 PROCEDURE — 94761 N-INVAS EAR/PLS OXIMETRY MLT: CPT

## 2023-10-23 PROCEDURE — 85027 COMPLETE CBC AUTOMATED: CPT | Performed by: INTERNAL MEDICINE

## 2023-10-23 PROCEDURE — 80048 BASIC METABOLIC PNL TOTAL CA: CPT | Performed by: INTERNAL MEDICINE

## 2023-10-23 PROCEDURE — 25000003 PHARM REV CODE 250: Performed by: INTERNAL MEDICINE

## 2023-10-23 PROCEDURE — 25000003 PHARM REV CODE 250: Performed by: FAMILY MEDICINE

## 2023-10-23 PROCEDURE — 83735 ASSAY OF MAGNESIUM: CPT | Performed by: INTERNAL MEDICINE

## 2023-10-23 PROCEDURE — 84100 ASSAY OF PHOSPHORUS: CPT | Performed by: INTERNAL MEDICINE

## 2023-10-23 RX ORDER — SUMATRIPTAN 50 MG/1
100 TABLET, FILM COATED ORAL ONCE
Status: COMPLETED | OUTPATIENT
Start: 2023-10-23 | End: 2023-10-23

## 2023-10-23 RX ORDER — LEVOFLOXACIN 750 MG/1
750 TABLET ORAL DAILY
Qty: 7 TABLET | Refills: 0 | Status: SHIPPED | OUTPATIENT
Start: 2023-10-23

## 2023-10-23 RX ORDER — POTASSIUM CHLORIDE 20 MEQ/1
20 TABLET, EXTENDED RELEASE ORAL 2 TIMES DAILY
Qty: 20 TABLET | Refills: 0 | Status: SHIPPED | OUTPATIENT
Start: 2023-10-23

## 2023-10-23 RX ADMIN — HYDROCHLOROTHIAZIDE 25 MG: 25 TABLET ORAL at 09:10

## 2023-10-23 RX ADMIN — VALSARTAN 160 MG: 80 TABLET, FILM COATED ORAL at 09:10

## 2023-10-23 RX ADMIN — MUPIROCIN 1 G: 20 OINTMENT TOPICAL at 09:10

## 2023-10-23 RX ADMIN — DULOXETINE 60 MG: 60 CAPSULE, DELAYED RELEASE ORAL at 09:10

## 2023-10-23 RX ADMIN — PANTOPRAZOLE SODIUM 40 MG: 40 TABLET, DELAYED RELEASE ORAL at 09:10

## 2023-10-23 RX ADMIN — POTASSIUM CHLORIDE 20 MEQ: 1500 TABLET, EXTENDED RELEASE ORAL at 09:10

## 2023-10-23 RX ADMIN — SUMATRIPTAN SUCCINATE 100 MG: 50 TABLET ORAL at 03:10

## 2023-10-23 NOTE — PROGRESS NOTES
[facility]                     [address] [address]    PATIENT NAME:       DENVER MONTES   YOB: 1975  CSN:                [id number]   MRN:                [id number]  ADMIT DATE:         10/19/2023 15:22:00  PHYSICIAN:          Alexandru Zapata MD                            PROGRESS NOTE    DATE:      Full code.    SUBJECTIVE:  The patient was visited in the room and she is having no specific   complaint.  This is a patient of Dr. Gill, who was admitted for UTI with   febrile illness.  Offending pathogen is Escherichia coli sensitive to Levaquin.    I have changed the Levaquin yesterday.  The patient has remained afebrile and   tolerated the Levaquin well.  Most likely, she can be discharged tomorrow on   p.o. Levaquin.    OBJECTIVE:  As far as patient's vitals are concerned, they are as follows,  VITAL SIGNS:  Her temperature is 98.2, blood pressure 169/81, pulse 75, O2 sat   98%.  HEENT:  Head is normocephalic.  Pupils bilaterally react to light and equal in   size.  Mild conjunctival pallor.  No scleral icterus.  Trachea is in midline.  CHEST:  Has good bilateral air entry.  CVS:  First and second heart sounds are heard normally without any added   murmurs.  ABDOMEN:  Soft, nontender.   EXTREMITIES:  Devoid of any edema, cyanosis or clubbing.    LABS AND INVESTIGATIONS:  As follows; WBC 4.81, hemoglobin 11.1, hematocrit   35.3, MCV 86.9, platelet count 233.  Sodium 139, potassium 3.2, chloride 102,   bicarb 26, BUN 4.0, creatinine 0.76, GFR more than 60, glucose 111, albumin 2.5.    IMPRESSION:    1. Pyelonephritis secondary to Escherichia coli, pansensitive.  Antibiotics   changed to Levaquin.  2. Hypokalemia.  3. History of hypertension.  4. History of insomnia.  5. History of morbid obesity.    PLAN:  Continue the present line of treatment.  Continue with IV Levaquin.    Supplement the potassium.   Continue with the home medications.    Pleasure taking care of Ms. Maxwell Noble during her stay at [facility] on the third floor.        ______________________________  MD SHERIDAN Rosenberg/ABDIAZIZ  DD:  10/22/2023  Time:  07:55PM  DT:  10/22/2023  Time:  08:39PM  Job #:  [id number]      PROGRESS NOTE

## 2023-10-23 NOTE — ASSESSMENT & PLAN NOTE
Likely due to her pyelonephritis.  Doing better at this time again we will give 2 days off work at least and reassess when she sees me in the office

## 2023-10-23 NOTE — DISCHARGE SUMMARY
[facility]  Hospital Medicine  Discharge Summary      Patient Name: Aide Arreola  MRN: [id number]  COURTNEY: [id number]  Patient Class: IP- Inpatient  Admission Date: 10/19/2023  Hospital Length of Stay: 4 days  Discharge Date and Time:  10/23/2023 7:42 AM  Attending Physician: Maik Gill MD   Discharging Provider: Maik Gill MD  Primary Care Provider: Maik Gill MD    Primary Care Team: Networked reference to record PCT     HPI:   Bed to my office with fever and weakness the patient is a 48-year-old  female known to me from clinic.  She was seen in the ER last night diagnosed with urinary tract infection she has since developed a fever of 101.5 chills rigors.  She had full workup for flu COVID last night which was all within normal limits states today she is having dysuria hematuria she is had some right flank pain and pain to her right inguinal area.  We are admitting the hospital for probable pyelonephritis.  She currently has nausea and has thrown up at least twice.  No other abdominal pain outside of the flank pain mentioned previously.  No coughing no chest pain that she says she is a little short of breath at this time currently oxygen sats are within normal limits.  She is had this has progressed over 3-4 days and culminated today.  Denies any sick contacts any recent travel or anything else out of the ordinary      * No surgery found *      Hospital Course:   Patient is a 48-year-old  female who presented to the clinic with signs of urinary tract infection severe weakness and what appeared to be some altered mental status.  Was admitted and noted to have pyelonephritis.  Cultures grew out E coli.  Initially she was on Zosyn which appeared to be making some progress surgery coli came back pansensitive Dr. King changed her to Levaquin.  She has done well on Levaquin decrease in her white blood cell count she has had only  minimal elevation of her blood pressure during her stay but this has been mostly controlled.  She did have some notable hypokalemia.  We have been replacing her potassium IV and orally.  Currently she says her weakness has improved she is not having any pain or nausea and vomiting.       Goals of Care Treatment Preferences:  Code Status: Full Code      Consults:   Consults (From admission, onward)        Status Ordering Provider     Inpatient consult to Midline team  Once        Provider:  (Not yet assigned)    Acknowledged AYAN JOSHI          Psychiatric  Depressive disorder  Continue home meds as written    Pulmonary  Shortness of breath  Chest x-ray is within normal limits at this time we will continue to monitor closely      Cardiac/Vascular  Hypertension  Continue home meds as written stable at this time      Renal/  Hypokalemia  We will continue on 20 mEq of KCl twice a day for 7 days    ID  * Pyelonephritis  Patient with E coli we will discharge on oral Levaquin 750 mg once a day for 7 days we will follow her up in my office within 2 days and likely have her return to work if she is doing well at that time.    Endocrine  Morbid obesity  Body mass index is 54.93 kg/m². Morbid obesity complicates all aspects of disease management from diagnostic modalities to treatment. Weight loss encouraged and health benefits explained to patient.         GI  Nausea  Continue Zofran as necessary      Other  Weakness  Likely due to her pyelonephritis.  Doing better at this time again we will give 2 days off work at least and reassess when she sees me in the office      Final Active Diagnoses:    Diagnosis Date Noted POA    PRINCIPAL PROBLEM:  Pyelonephritis [N12] 10/19/2023 Yes    Hypokalemia [E87.6] 10/20/2023 Yes    Weakness [R53.1] 10/19/2023 Yes    Nausea [R11.0] 10/19/2023 Yes    Shortness of breath [R06.02] 10/19/2023 Yes    Depressive disorder [F32.A] 10/19/2023 Yes    Hypertension [I10] 10/19/2023 Yes     Morbid obesity [E66.01] 10/19/2023 Yes      Problems Resolved During this Admission:       Discharged Condition: good    Disposition: Home or Self Care    Follow Up:   Follow-up Information     Maik Gill MD. Schedule an appointment as soon as possible for a visit in 2 day(s).    Specialty: Family Medicine  Contact information:  [address]  [address]  [place] [address]  [phone]                       Patient Instructions:      Diet Adult Regular     Activity as tolerated       Significant Diagnostic Studies: N/A    Pending Diagnostic Studies:     None         Medications:  Reconciled Home Medications:      Medication List      START taking these medications    levoFLOXacin 750 MG tablet  Commonly known as: LEVAQUIN  Take 1 tablet (750 mg total) by mouth once daily.     potassium chloride SA 20 MEQ tablet  Commonly known as: K-DUR,KLOR-CON  Take 1 tablet (20 mEq total) by mouth 2 (two) times daily.        CONTINUE taking these medications    DULoxetine 60 MG capsule  Commonly known as: CYMBALTA  Take 60 mg by mouth.     QUEtiapine 25 MG Tab  Commonly known as: SEROQUEL  Take 25 mg by mouth every evening.     sumatriptan 100 MG tablet  Commonly known as: IMITREX  Take 100 mg by mouth.     valsartan-hydrochlorothiazide 160-25 mg per tablet  Commonly known as: DIOVAN-HCT  Take 1 tablet by mouth.     zolpidem 10 mg Tab  Commonly known as: AMBIEN  Take 10 mg by mouth.        STOP taking these medications    cefdinir 300 MG capsule  Commonly known as: OMNICEF            Indwelling Lines/Drains at time of discharge:   Lines/Drains/Airways     Peripherally Inserted Central Catheter Line  Duration           PICC Double Lumen 10/19/23 1701 right brachial 3 days                Time spent on the discharge of patient: 35 minutes         Maik Gill MD  Department of Hospital Medicine  [facility]

## 2023-10-23 NOTE — ASSESSMENT & PLAN NOTE
Patient with E coli we will discharge on oral Levaquin 750 mg once a day for 7 days we will follow her up in my office within 2 days and likely have her return to work if she is doing well at that time.

## 2023-10-24 ENCOUNTER — PATIENT OUTREACH (OUTPATIENT)
Dept: ADMINISTRATIVE | Facility: CLINIC | Age: 48
End: 2023-10-24
Payer: COMMERCIAL

## 2023-10-24 NOTE — PROGRESS NOTES
C3 nurse attempted to contact Aide Arreola  for a TCC post hospital discharge follow up call. No answer. Left voicemail with callback information. The patient has a scheduled HOSFU appointment with Maik Gill MD  on 10/26/2023 @ 8 am.

## 2023-10-25 LAB — BACTERIA BLD CULT: NORMAL

## 2024-01-22 PROBLEM — N12 PYELONEPHRITIS: Status: RESOLVED | Noted: 2023-10-19 | Resolved: 2024-01-22

## 2025-04-16 DIAGNOSIS — Z12.31 OTHER SCREENING MAMMOGRAM: Primary | ICD-10-CM

## 2025-04-30 ENCOUNTER — HOSPITAL ENCOUNTER (OUTPATIENT)
Dept: RADIOLOGY | Facility: HOSPITAL | Age: 50
Discharge: HOME | End: 2025-04-30
Attending: FAMILY MEDICINE
Payer: COMMERCIAL

## 2025-04-30 DIAGNOSIS — Z12.31 OTHER SCREENING MAMMOGRAM: ICD-10-CM

## 2025-04-30 PROCEDURE — 77067 SCR MAMMO BI INCL CAD: CPT | Mod: TC

## 2025-06-09 ENCOUNTER — CLINICAL SUPPORT (OUTPATIENT)
Dept: RESPIRATORY THERAPY | Facility: HOSPITAL | Age: 50
End: 2025-06-09
Attending: INTERNAL MEDICINE
Payer: COMMERCIAL

## 2025-06-09 DIAGNOSIS — Z01.818 PREOP EXAMINATION: ICD-10-CM

## 2025-06-09 DIAGNOSIS — Z01.818 PREOP EXAMINATION: Primary | ICD-10-CM

## 2025-06-09 LAB
OHS QRS DURATION: 84 MS
OHS QTC CALCULATION: 420 MS

## 2025-06-09 PROCEDURE — 93010 ELECTROCARDIOGRAM REPORT: CPT | Mod: ,,, | Performed by: STUDENT IN AN ORGANIZED HEALTH CARE EDUCATION/TRAINING PROGRAM

## 2025-06-09 PROCEDURE — 93005 ELECTROCARDIOGRAM TRACING: CPT

## 2025-06-09 RX ORDER — NEBIVOLOL 10 MG/1
10 TABLET ORAL EVERY MORNING
COMMUNITY
Start: 2025-04-15

## 2025-06-09 RX ORDER — IPRATROPIUM BROMIDE 21 UG/1
1 SPRAY, METERED NASAL 2 TIMES DAILY
COMMUNITY
Start: 2025-05-08

## 2025-06-09 NOTE — DISCHARGE INSTRUCTIONS
Follow prep on Thursday. Clear liquids only. Nothing by mouth after midnight. Take Bystolic with small sip of water.           INSTRUCTIONS  AFTER A COLONOSCOPY/EGD    NO DRIVING X 24 HOURS. NOTIFY YOUR DOCTOR WITH     ABDOMINAL PAIN UNRELIEVED BY  PASSING GAS,   FEVER WITHIN 24 HOURS, OR LARGE AMOUNT OF BLEEDING.

## 2025-06-13 ENCOUNTER — ANESTHESIA (OUTPATIENT)
Dept: SURGERY | Facility: HOSPITAL | Age: 50
End: 2025-06-13
Payer: COMMERCIAL

## 2025-06-13 ENCOUNTER — HOSPITAL ENCOUNTER (OUTPATIENT)
Facility: HOSPITAL | Age: 50
Discharge: HOME | End: 2025-06-13
Attending: INTERNAL MEDICINE | Admitting: INTERNAL MEDICINE
Payer: COMMERCIAL

## 2025-06-13 ENCOUNTER — ANESTHESIA EVENT (OUTPATIENT)
Dept: SURGERY | Facility: HOSPITAL | Age: 50
End: 2025-06-13
Payer: COMMERCIAL

## 2025-06-13 VITALS
HEART RATE: 58 BPM | BODY MASS INDEX: 48.82 KG/M2 | TEMPERATURE: 98 F | OXYGEN SATURATION: 95 % | RESPIRATION RATE: 18 BRPM | HEIGHT: 65 IN | SYSTOLIC BLOOD PRESSURE: 144 MMHG | DIASTOLIC BLOOD PRESSURE: 88 MMHG | WEIGHT: 293 LBS

## 2025-06-13 DIAGNOSIS — Z12.11 SCREENING FOR COLON CANCER: ICD-10-CM

## 2025-06-13 LAB — B-HCG UR QL: NEGATIVE

## 2025-06-13 PROCEDURE — 63600175 PHARM REV CODE 636 W HCPCS: Performed by: NURSE ANESTHETIST, CERTIFIED REGISTERED

## 2025-06-13 PROCEDURE — 81025 URINE PREGNANCY TEST: CPT | Performed by: ANESTHESIOLOGY

## 2025-06-13 PROCEDURE — G0121 COLON CA SCRN NOT HI RSK IND: HCPCS | Performed by: INTERNAL MEDICINE

## 2025-06-13 PROCEDURE — 37000009 HC ANESTHESIA EA ADD 15 MINS: Performed by: INTERNAL MEDICINE

## 2025-06-13 PROCEDURE — 37000008 HC ANESTHESIA 1ST 15 MINUTES: Performed by: INTERNAL MEDICINE

## 2025-06-13 RX ORDER — LIDOCAINE HYDROCHLORIDE 20 MG/ML
INJECTION INTRAVENOUS
Status: DISCONTINUED | OUTPATIENT
Start: 2025-06-13 | End: 2025-06-13

## 2025-06-13 RX ORDER — METOCLOPRAMIDE HYDROCHLORIDE 5 MG/ML
INJECTION INTRAMUSCULAR; INTRAVENOUS
Status: DISCONTINUED | OUTPATIENT
Start: 2025-06-13 | End: 2025-06-13

## 2025-06-13 RX ORDER — LIDOCAINE HYDROCHLORIDE 10 MG/ML
1 INJECTION, SOLUTION EPIDURAL; INFILTRATION; INTRACAUDAL; PERINEURAL ONCE
Status: DISCONTINUED | OUTPATIENT
Start: 2025-06-13 | End: 2025-06-13 | Stop reason: HOSPADM

## 2025-06-13 RX ORDER — FENTANYL CITRATE 50 UG/ML
INJECTION, SOLUTION INTRAMUSCULAR; INTRAVENOUS
Status: DISCONTINUED | OUTPATIENT
Start: 2025-06-13 | End: 2025-06-13

## 2025-06-13 RX ORDER — PROPOFOL 10 MG/ML
VIAL (ML) INTRAVENOUS
Status: DISCONTINUED | OUTPATIENT
Start: 2025-06-13 | End: 2025-06-13

## 2025-06-13 RX ADMIN — PROPOFOL 100 MG: 10 INJECTION, EMULSION INTRAVENOUS at 11:06

## 2025-06-13 RX ADMIN — PROPOFOL 25 MG: 10 INJECTION, EMULSION INTRAVENOUS at 12:06

## 2025-06-13 RX ADMIN — PROPOFOL 50 MG: 10 INJECTION, EMULSION INTRAVENOUS at 12:06

## 2025-06-13 RX ADMIN — METOCLOPRAMIDE 10 MG: 5 INJECTION, SOLUTION INTRAMUSCULAR; INTRAVENOUS at 12:06

## 2025-06-13 RX ADMIN — LIDOCAINE HYDROCHLORIDE 100 MG: 20 INJECTION, SOLUTION INTRAVENOUS at 11:06

## 2025-06-13 RX ADMIN — PROPOFOL 50 MG: 10 INJECTION, EMULSION INTRAVENOUS at 11:06

## 2025-06-13 RX ADMIN — FENTANYL CITRATE 50 MCG: 50 INJECTION, SOLUTION INTRAMUSCULAR; INTRAVENOUS at 11:06

## 2025-06-13 NOTE — ANESTHESIA POSTPROCEDURE EVALUATION
Anesthesia Post Evaluation    Patient: Aide Arreola    Procedure(s) Performed: Procedure(s) (LRB):  COLONOSCOPY (N/A)    Final Anesthesia Type: general      Patient location during evaluation: OPS  Patient participation: Yes- Able to Participate  Level of consciousness: awake and alert  Post-procedure vital signs: reviewed and stable  Pain management: adequate  Airway patency: patent  YUDI mitigation strategies: Multimodal analgesia  PONV status at discharge: No PONV  Anesthetic complications: no      Cardiovascular status: hemodynamically stable  Respiratory status: unassisted, room air and spontaneous ventilation  Hydration status: euvolemic  Follow-up not needed.              Vitals Taken Time   BP 06/13/25 12:55   Temp 06/13/25 12:55   Pulse 06/13/25 12:55   Resp 06/13/25 12:55   SpO2 06/13/25 12:55         No case tracking events are documented in the log.      Pain/Mohini Score: No data recorded

## 2025-06-13 NOTE — OP NOTE
[facility] Periop Services  Operative Note    Date of Procedure: 6/13/2025     Procedure: Procedure(s) (LRB):  COLONOSCOPY (N/A)   Colonoscopy    Surgeons and Role:     * Aris Jordan III, MD - Primary    Assisting Surgeon: None    Pre-Operative Diagnosis: Colon cancer screening [Z12.11]  Colon cancer screening    Post-Operative Diagnosis: Post-Op Diagnosis Codes:     * Colon cancer screening [Z12.11]  Very tortuous colon   Suboptimal prep    Endoscopic Specimens:  * No specimens in log *      Anesthesia: General    Consent:  Patient was consented for the procedure at my office.  The risks and benefits of procedure explained in detail.  They were willing to undergo those risks.    HPI & Operative Findings (including complications, if any):  49-year-old black female average colorectal cancer risk without alarm features here for screening colonoscopy.    She was brought down to the endoscopy room suite.  Bharat Monzon CRNA present.  Please see his documentation for medications administered please see his documentation for medications administered..      Patient is laid in left lateral decubitus position rectal exam was within normal limits.      The adult Olympus colonoscope was tried numerous times to try to get over to the right side.  At 1 point I could see down the right side and see that there were no obvious masses in the ascending colon but I could not get to the cecum.  Tried multiple attempts the help from at least 5 members of the staff.  She is extremely obese.  We tried getting her in multiple positions even a supine position and even a partial right lateral decubitus I could not reach the cecum.    Of note, the patient had a reddish tinge to her prep.  I am curious if she took some red Jell-O.  Her H&H was normal.  I did not see any again any obvious masses in the colon.  The ascending colon hepatic flexure transverse colon splenic flexure descending and sigmoid were all free of any  mass lesions polyps or mucosal changes.  No apparent trauma to the scope to the colon was noted.      The scope was removed patient tolerated procedure well.      Discussion:     Colonoscopy in 5 years unless clinically indicated sooner.  No red Jell-O prep next time.    Blood Loss (EBL): 0 mL           Implants: * No implants in log *    Specimens:   Specimen (24h ago, onward)      None                    Condition: Stable for Discharge    Disposition: Home or Self Care        Discharge Note    OUTCOME: Patient tolerated treatment/procedure well without complication and is now ready for discharge.    DISPOSITION: Home or Self Care    FINAL DIAGNOSIS:  <principal problem not specified>    FOLLOWUP: Repeat endoscopy per physican/ACG guidelines determined at time of the procedure    DISCHARGE INSTRUCTIONS:  No discharge procedures on file.

## 2025-06-13 NOTE — ANESTHESIA PREPROCEDURE EVALUATION
06/13/2025  Aide rAreola is a 49 y.o., female.      Pre-op Assessment    I have reviewed the Patient Summary Reports.     I have reviewed the Nursing Notes. I have reviewed the NPO Status.   I have reviewed the Medications.     Review of Systems  Anesthesia Hx:             Denies Family Hx of Anesthesia complications.    Denies Personal Hx of Anesthesia complications.                    Social:  No Alcohol Use, Non-Smoker       Hematology/Oncology:  Hematology Normal   Oncology Normal                                   EENT/Dental:  EENT/Dental Normal           Cardiovascular:     Hypertension, well controlled              ECG has been reviewed.                            Pulmonary:      Shortness of breath                  Renal/:  Renal/ Normal                 Hepatic/GI:  Hepatic/GI Normal                    Musculoskeletal:  Musculoskeletal Normal                Neurological:  Neurology Normal                                      Endocrine:  Endocrine Normal          Morbid Obesity / BMI > 40  Dermatological:  Skin Normal    Psych:  Psychiatric History                  Physical Exam  General: Cooperative, Alert and Oriented    Airway:  Mallampati: II   Mouth Opening: Normal  TM Distance: Normal  Tongue: Normal  Neck ROM: Normal ROM    Dental:  Intact        Anesthesia Plan  Type of Anesthesia, risks & benefits discussed:    Anesthesia Type: Gen Natural Airway  Intra-op Monitoring Plan: Standard ASA Monitors  Post Op Pain Control Plan:   (medical reason for not using multimodal pain management)  Induction:  IV  Informed Consent: Informed consent signed with the Patient and all parties understand the risks and agree with anesthesia plan.  All questions answered. Patient consented to blood products? Yes  ASA Score: 3    Ready For Surgery From Anesthesia Perspective.     .

## 2025-06-13 NOTE — ANESTHESIA PROCEDURE NOTES
Peripheral IV Insertion    Diagnosis: I99.8 Other disorder of circulatory system    Patient location during procedure: OR  Timeout: 6/13/2025 11:40 AM  Procedure end time: 6/13/2025 11:46 AM    Staffing  Authorizing Provider: Ike Kennedy DO  Performing Provider: Maisha Monzon CRNA    Staffing  Performed by: Maisha Monzon CRNA  Authorized by: Ike Kennedy DO      Preanesthetic Checklist  Completed: patient identified, IV checked, site marked, risks and benefits discussed, surgical consent, monitors and equipment checked, pre-op evaluation, timeout performed and anesthesia consent givenPeripheral IV Insertion  Skin Prep: alcohol swabs  Local Infiltration: none  Orientation: right  Location: antecubital  Catheter Type: peripheral IV (single lumen)  Catheter Size: 20 G  Catheter placement by Ultrasound guidance. Heme positive aspiration all ports.   Vessel Caliber: medium, patent, compressibility normal  Vascular Doppler:  not done  Needle advanced into vessel with real time Ultrasound guidance.Insertion Attempts: 1  Assessment  Dressing: secured with tape and tegaderm  Patient: Tolerated well  Line flushed easily.

## 2025-06-18 ENCOUNTER — HOSPITAL ENCOUNTER (OUTPATIENT)
Dept: RADIOLOGY | Facility: HOSPITAL | Age: 50
Discharge: HOME | End: 2025-06-18
Attending: FAMILY MEDICINE
Payer: COMMERCIAL

## 2025-06-18 DIAGNOSIS — R92.8 ABNORMAL MAMMOGRAM: ICD-10-CM

## 2025-06-18 PROCEDURE — 77061 BREAST TOMOSYNTHESIS UNI: CPT | Mod: TC,RT

## 2025-06-18 PROCEDURE — 77065 DX MAMMO INCL CAD UNI: CPT | Mod: 26,RT,, | Performed by: STUDENT IN AN ORGANIZED HEALTH CARE EDUCATION/TRAINING PROGRAM

## 2025-06-18 PROCEDURE — 77061 BREAST TOMOSYNTHESIS UNI: CPT | Mod: 26,RT,, | Performed by: STUDENT IN AN ORGANIZED HEALTH CARE EDUCATION/TRAINING PROGRAM

## 2025-07-16 ENCOUNTER — HOSPITAL ENCOUNTER (OUTPATIENT)
Dept: RADIOLOGY | Facility: HOSPITAL | Age: 50
Discharge: HOME | End: 2025-07-16
Attending: FAMILY MEDICINE
Payer: COMMERCIAL

## 2025-07-16 DIAGNOSIS — R92.8 ABNORMAL MAMMOGRAM: Primary | ICD-10-CM

## 2025-07-16 PROCEDURE — A4648 IMPLANTABLE TISSUE MARKER: HCPCS

## 2025-07-16 RX ORDER — BUPIVACAINE HYDROCHLORIDE 2.5 MG/ML
20 INJECTION, SOLUTION EPIDURAL; INFILTRATION; INTRACAUDAL; PERINEURAL ONCE
Status: DISCONTINUED | OUTPATIENT
Start: 2025-07-16 | End: 2025-07-17 | Stop reason: HOSPADM

## 2025-07-18 LAB — PSYCHE PATHOLOGY RESULT: NORMAL

## (undated) DEVICE — KIT SURGICAL COLON .25 1.1OZ